# Patient Record
Sex: MALE | Race: WHITE | Employment: OTHER | URBAN - METROPOLITAN AREA
[De-identification: names, ages, dates, MRNs, and addresses within clinical notes are randomized per-mention and may not be internally consistent; named-entity substitution may affect disease eponyms.]

---

## 2017-02-15 ENCOUNTER — OFFICE VISIT (OUTPATIENT)
Dept: ENDOCRINOLOGY | Age: 72
End: 2017-02-15

## 2017-02-15 VITALS
BODY MASS INDEX: 28.49 KG/M2 | DIASTOLIC BLOOD PRESSURE: 85 MMHG | HEART RATE: 60 BPM | HEIGHT: 73 IN | WEIGHT: 215 LBS | SYSTOLIC BLOOD PRESSURE: 135 MMHG

## 2017-02-15 DIAGNOSIS — E03.8 OTHER SPECIFIED HYPOTHYROIDISM: Primary | ICD-10-CM

## 2017-02-15 PROBLEM — E03.9 HYPOTHYROIDISM: Status: ACTIVE | Noted: 2017-02-15

## 2017-02-15 NOTE — MR AVS SNAPSHOT
Visit Information Date & Time Provider Department Dept. Phone Encounter #  
 2/15/2017  9:10 AM Octavio Rodriguez MD [facility] [phone] [id number] Follow-up Instructions Return in about 3 months (around 5/15/2017). Your Appointments 3/1/2017  1:30 PM  
Office Visit with DES Pineda [address] [place] [address]) Appt Note: 3 Months Skin exam  
 [facility] [address]  
[address] [place] [address] [address] [place] [id number] 7/14/2017 10:30 AM  
Office Visit with DES Pineda [address] [place] [address] Road) Appt Note: 1 year skin exam  
 [facility] [address]  
[phone] Upcoming Health Maintenance Date Due Hepatitis C Screening 1945 DTaP/Tdap/Td series (1 - Tdap) 2/16/1966 FOBT Q 1 YEAR AGE 50-75 2/16/1995 ZOSTER VACCINE AGE 60> 2/16/2005 GLAUCOMA SCREENING Q2Y 2/16/2010 Pneumococcal 65+ High/Highest Risk (1 of 2 - PCV13) 2/16/2010 MEDICARE YEARLY EXAM 2/16/2010 INFLUENZA AGE 9 TO ADULT 8/1/2016 Allergies as of 2/15/2017  Review Complete On: 2/15/2017 By: Octavio Rodriguez MD  
  
 Severity Noted Reaction Type Reactions Codeine  03/21/2016    Other (comments) Bad dreams Current Immunizations  Never Reviewed No immunizations on file. Not reviewed this visit You Were Diagnosed With   
  
 Codes Comments Other specified hypothyroidism    -  Primary ICD-10-CM: E03.8 ICD-9-CM: 244.8 Vitals BP Pulse Height(growth percentile) Weight(growth percentile) BMI Smoking Status 135/85 60 6' 1\" (1.854 m) 215 lb (97.5 kg) 28.37 kg/m2 Never Smoker Vitals History BMI and BSA Data Body Mass Index Body Surface Area  
 28.37 kg/m 2 2.24 m 2 Preferred Pharmacy Pharmacy Name Phone [facility][id number]- [address] [facility], [address] [place] [address] Dr [phone] Your Updated Medication List  
  
   
This list is accurate as of: 2/15/17  9:47 AM.  Always use your most recent med list.  
  
  
  
  
 amiodarone 100 mg tablet Commonly known as:  Griselda Borer Take  by mouth daily. 1/4 tablet every 3 days  
  
 aspirin delayed-release 81 mg tablet Take 81 mg by mouth daily. BENICAR 40 mg tablet Generic drug:  olmesartan Take 40 mg by mouth once over twenty-four (24) hours. carvedilol 6.25 mg tablet Commonly known as:  Wileen Marshall Take 6.25 mg by mouth two (2) times a day. synthroid 50 mcg tablet Generic drug:  levothyroxine TAKE 1 TAB BY MOUTH DAILY (BEFORE BREAKFAST). BRAND NAME REQUIRED We Performed the Following T4, FREE [id number] CPT(R)] TSH 3RD GENERATION [45581 CPT(R)] Follow-up Instructions Return in about 3 months (around 5/15/2017). Introducing [facility] & HEALTH SERVICES! David Gonzalez introduces [employer] patient portal. Now you can access parts of your medical record, email your doctor's office, and request medication refills online. 1. In your internet browser, go to [email or web address] 2. Click on the First Time User? Click Here link in the Sign In box. You will see the New Member Sign Up page. 3. Enter your [employer] Access Code exactly as it appears below. You will not need to use this code after youve completed the sign-up process. If you do not sign up before the expiration date, you must request a new code. · [employer] Access Code: [email or web address] Expires: 5/16/2017  9:47 AM 
 
4. Enter the last four digits of your Social Security Number (xxxx) and Date of Birth (mm/dd/yyyy) as indicated and click Submit. You will be taken to the next sign-up page. 5. Create a [employer] ID. This will be your [employer] login ID and cannot be changed, so think of one that is secure and easy to remember. 6. Create a [employer] password. You can change your password at any time. 7. Enter your Password Reset Question and Answer. This can be used at a later time if you forget your password. 8. Enter your e-mail address. You will receive e-mail notification when new information is available in [address] 9. Click Sign Up. You can now view and download portions of your medical record. 10. Click the Download Summary menu link to download a portable copy of your medical information. If you have questions, please visit the Frequently Asked Questions section of the [employer] website. Remember, [employer] is NOT to be used for urgent needs. For medical emergencies, dial 911. Now available from your iPhone and Android! Please provide this summary of care documentation to your next provider. Your primary care clinician is listed as [address]. If you have any questions after today's visit, please call [phone].

## 2017-02-15 NOTE — PROGRESS NOTES
Chief Complaint   Patient presents with    Thyroid Problem     pcp and pharmacy verified   Records since last visit reviewed  History of Present Illness: Josiane Higgins is a 70 y.o. male here for follow up of hypothyroidism. Pt notes he was started on Amiodarone for Afib in 2013. He has continued on the Amiodarone, but his dose has been reduced and he is now taking 50mg every 3 days. In November 2015 his TSH was 11.49 he was started on LT4 50mcg, but he \"felt like I was having too much coffee\" so he cut it back to 25mg daily. By February 2016 his TSH was down to 8.72. In March his TSH was 7.79 with FT4 1.08 and TPO 20. Per the chart review he had been on generic LT4 and when he was on 50mcg he felt \"unwell\", but on 25mcg he felt ok. In March 2016 he agreed to try branded SYNTHROID 50mcg daily. In June 2016 his TSH was 4.22 and was continue on SYNTHROID 50mcg. He also had a thyroid US in March 2016 which was normal.    Pt presented to his PCP with concerns of a mole on his arm and a spot on his ear that he was concerned about possible cancer. He was referred to a Dermatologist, Dr. Mandi Perry who biopsied the ear and the arm lesion. The ear lesion was not cancerous but the mole on his left arm was positive for melanoma. It has no evidence of spread and his only treatment with resection only, no chemo or radiation. Pt had follow up with dermatology in December 2016 and there was no evidence of new cancerous lesions. Pt noted that his goal was to try and stop the SYNTHROID altogether. We agreed to try pt on SYNTHROID 25mcg daily and reassess in 3 months. His TSH last week was 1.820 with FT4 of 1.21    Pt has been taking 25mcg and 37mcg alternately. He has been off the amiodarone for 2 months. He denies issues of palpitations, tremors, CP, SOB, heat or cold intolerance, diarrhea or constipation.        Current Outpatient Prescriptions   Medication Sig    SYNTHROID 50 mcg tablet TAKE 1 TAB BY MOUTH DAILY (BEFORE BREAKFAST). BRAND NAME REQUIRED (Patient taking differently: TAKE 1 TAB BY MOUTH DAILY (BEFORE BREAKFAST). BRAND NAME REQUIRED. Alternates 25 mcg and 37 mcg each day.)    BENICAR 40 mg tablet Take 40 mg by mouth once over twenty-four (24) hours.  carvedilol (COREG) 6.25 mg tablet Take 6.25 mg by mouth two (2) times a day.  aspirin delayed-release 81 mg tablet Take 81 mg by mouth daily.  amiodarone (PACERONE) 100 mg tablet Take  by mouth daily. 1/4 tablet every 3 days     No current facility-administered medications for this visit. Allergies   Allergen Reactions    Codeine Other (comments)     Bad dreams     Review of Systems:  - Cardiovascular: no chest pain  - Neurological: no tremors  - Integumentary: skin is normal    Physical Examination:  Blood pressure 135/85, pulse 60, height 6' 1\" (1.854 m), weight 215 lb (97.5 kg). - General: pleasant, no distress, good eye contact   - Neck: no thyromegaly or thyroid bruits  - Cardiovascular: regular, normal rate, nl s1 and s2, no m/r/g   - Integumentary: skin is normal, no edema  - Neurological: reflexes 2+ at biceps, no tremors  - Psychiatric: normal mood and affect    Data Reviewed:   Component      Latest Ref Rng & Units 2/14/2017 2/14/2017          12:07 PM 12:07 PM   T4, Free      0.82 - 1.77 ng/dL  1.21   TSH      0.450 - 4.500 uIU/mL 1.820        Assessment/Plan:   1) Hypothyroidism > Pt is clinically euthyroid on the lower dose of SYNTHROID. Since he is now off the amiodarone, which seems to have been the cause of his hypothyroidism in the first place, will have pt stop the SYNTHROID altogether. We will repeat his TFTs in 3 months and see if he needs to continue the SYNTHROID or can stay off of it indefinitely. RTC 3 months. Follow-up Disposition:  Return in about 3 months (around 5/15/2017). Copy sent to:  Bryant Hamilton and Cathi Lara

## 2017-02-15 NOTE — PROGRESS NOTES
Blood pressure has been repeated in opposite arm. No symptoms, per patient. Had chinese food last pm. Stressful drive.

## 2017-03-14 ENCOUNTER — OFFICE VISIT (OUTPATIENT)
Dept: DERMATOLOGY | Facility: AMBULATORY SURGERY CENTER | Age: 72
End: 2017-03-14

## 2017-03-14 VITALS
SYSTOLIC BLOOD PRESSURE: 140 MMHG | BODY MASS INDEX: 28.49 KG/M2 | DIASTOLIC BLOOD PRESSURE: 82 MMHG | WEIGHT: 215 LBS | HEART RATE: 68 BPM | HEIGHT: 73 IN | OXYGEN SATURATION: 98 % | TEMPERATURE: 98.1 F | RESPIRATION RATE: 20 BRPM

## 2017-03-14 DIAGNOSIS — D22.9 MULTIPLE BENIGN NEVI: Primary | ICD-10-CM

## 2017-03-14 DIAGNOSIS — D18.01 CHERRY ANGIOMA: ICD-10-CM

## 2017-03-14 DIAGNOSIS — L91.8 CUTANEOUS SKIN TAGS: ICD-10-CM

## 2017-03-14 DIAGNOSIS — L90.5 SCAR CONDITION AND FIBROSIS OF SKIN: ICD-10-CM

## 2017-03-14 DIAGNOSIS — L81.4 LENTIGINES: ICD-10-CM

## 2017-03-14 DIAGNOSIS — L82.1 SEBORRHEIC KERATOSES: ICD-10-CM

## 2017-03-14 DIAGNOSIS — Z85.820 PERSONAL HISTORY OF MALIGNANT MELANOMA OF SKIN: ICD-10-CM

## 2017-03-14 NOTE — MR AVS SNAPSHOT
Visit Information Date & Time Provider Department Dept. Phone Encounter #  
 3/14/2017  9:00 AM DES Lema57 [phone] Your Appointments 3/14/2017  9:00 AM  
Office Visit with DES Lema 8057 [facility]) Appt Note: 3 Months Skin exam; 3 Months Skin exam  
 [facility] [address] [facility] [address] [place] [id number]  
  
    
 5/9/2017  8:50 AM  
Follow Up with Stacey Santiago MD  
[facility]) Appt Note: f/u           dm             3 month  
 [address] [facility] [address] [id number] [address] [place] [address] 7/14/2017 10:30 AM  
Office Visit with DES Lema 8057 [facility]) Appt Note: 1 year skin exam  
 [facility] [address] [place] [phone] Upcoming Health Maintenance Date Due Hepatitis C Screening 1945 DTaP/Tdap/Td series (1 - Tdap) 2/16/1966 FOBT Q 1 YEAR AGE 50-75 2/16/1995 ZOSTER VACCINE AGE 60> 2/16/2005 GLAUCOMA SCREENING Q2Y 2/16/2010 Pneumococcal 65+ High/Highest Risk (1 of 2 - PCV13) 2/16/2010 MEDICARE YEARLY EXAM 2/16/2010 INFLUENZA AGE 9 TO ADULT 8/1/2016 Allergies as of 3/14/2017  Review Complete On: 3/14/2017 By: Lobo Leong LPN Severity Noted Reaction Type Reactions Codeine  03/21/2016    Other (comments) Bad dreams Current Immunizations  Never Reviewed No immunizations on file. Not reviewed this visit Vitals BP Pulse Temp Resp Height(growth percentile) Weight(growth percentile)  140/82 (BP 1 Location: Left arm, BP Patient Position: Sitting) 68 98.1 °F (36.7 °C) (Oral) 20 6' 1\" (1.854 m) 215 lb (97.5 kg) SpO2 BMI Smoking Status 98% 28.37 kg/m2 Never Smoker Vitals History BMI and BSA Data Body Mass Index Body Surface Area  
 28.37 kg/m 2 2.24 m 2 Preferred Pharmacy Pharmacy Name Phone [id number]- [address] , [address] [place] [address]  [phone] Your Updated Medication List  
  
   
This list is accurate as of: 3/14/17  8:34 AM.  Always use your most recent med list.  
  
  
  
  
 amiodarone 100 mg tablet Commonly known as:  Wes Aguilar Take  by mouth daily. 1/4 tablet every 3 days  
  
 aspirin delayed-release 81 mg tablet Take 81 mg by mouth daily. BENICAR 40 mg tablet Generic drug:  olmesartan Take 40 mg by mouth once over twenty-four (24) hours. carvedilol 6.25 mg tablet Commonly known as:  Jestine Pellet Take 6.25 mg by mouth two (2) times a day. synthroid 50 mcg tablet Generic drug:  levothyroxine TAKE 1 TAB BY MOUTH DAILY (BEFORE BREAKFAST). BRAND NAME REQUIRED Introducing [facility] & HEALTH SERVICES! [employer] introduces [employer] patient portal. Now you can access parts of your medical record, email your doctor's office, and request medication refills online. 1. In your internet browser, go to [email or web address] 2. Click on the First Time User? Click Here link in the Sign In box. You will see the New Member Sign Up page. 3. Enter your [employer] Access Code exactly as it appears below. You will not need to use this code after youve completed the sign-up process. If you do not sign up before the expiration date, you must request a new code. · [employer] Access Code: [email or web address] Expires: 5/16/2017 10:47 AM 
 
4. Enter the last four digits of your Social Security Number (xxxx) and Date of Birth (mm/dd/yyyy) as indicated and click Submit. You will be taken to the next sign-up page. 5. Create a [employer] ID. This will be your [employer] login ID and cannot be changed, so think of one that is secure and easy to remember. 6. Create a [employer] password. You can change your password at any time. 7. Enter your Password Reset Question and Answer. This can be used at a later time if you forget your password. 8. Enter your e-mail address. You will receive e-mail notification when new information is available in [address] 9. Click Sign Up. You can now view and download portions of your medical record. 10. Click the Download Summary menu link to download a portable copy of your medical information. If you have questions, please visit the Frequently Asked Questions section of the [employer] website. Remember, [employer] is NOT to be used for urgent needs. For medical emergencies, dial 911. Now available from your iPhone and Android! Please provide this summary of care documentation to your next provider. Your primary care clinician is listed as [address]. If you have any questions after today's visit, please call [phone].

## 2017-03-14 NOTE — PROGRESS NOTES
Name: Augustina Singletary       Age: 67 y.o. Date: 3/14/2017    Chief Complaint:   Chief Complaint   Patient presents with    Skin Exam     3 Month. Pt seen in office today for routine skin assessment. No specified concerns today. Subjective:    HPI  Mr. Augustina Singletary is a 67 y.o. male who presents for a full skin exam.  The patient's last skin exam was on 16 and the patient does not have current complaints related to his skin. Mr. Augustina Singletary is feeling well and in his usual state of health today. The patient's pertinent skin history includes : Stage 1 am malignant melanoma of the left forearm (treated 2016), breslow depth of 0.55 mm    ROS: Constitutional: Negative. Dermatological : negative      Social History     Social History    Marital status:      Spouse name: N/A    Number of children: N/A    Years of education: N/A     Occupational History    Not on file.      Social History Main Topics    Smoking status: Never Smoker    Smokeless tobacco: Never Used    Alcohol use No    Drug use: No    Sexual activity: Not on file     Other Topics Concern    Not on file     Social History Narrative       Family History   Problem Relation Age of Onset    Other Mother      hypotension    Heart Failure Father     Cancer Father      Prostate    Other Father      PUD    Heart Disease Father      Heart Valve issue and replacement    Cancer Brother      Melanoma -  at 39yrs old   Hany Cords Cancer Sister      Hodgkin's in one, melanoma in other    Cancer Paternal Uncle      Prostate    Cancer Maternal Grandfather      Lung       Past Medical History:   Diagnosis Date    Family history of skin cancer     brother-melanoma    Hyperlipidemia     Hypertension     Sun-damaged skin     Thyroid disorder        Past Surgical History:   Procedure Laterality Date    HX TONSILLECTOMY      at 10years old       Current Outpatient Prescriptions   Medication Sig Dispense Refill    amiodarone (PACERONE) 100 mg tablet Take  by mouth daily. 1/4 tablet every 3 days      BENICAR 40 mg tablet Take 40 mg by mouth once over twenty-four (24) hours.  carvedilol (COREG) 6.25 mg tablet Take 6.25 mg by mouth two (2) times a day. 4    aspirin delayed-release 81 mg tablet Take 81 mg by mouth daily.  SYNTHROID 50 mcg tablet TAKE 1 TAB BY MOUTH DAILY (BEFORE BREAKFAST). BRAND NAME REQUIRED (Patient taking differently: TAKE 1 TAB BY MOUTH DAILY (BEFORE BREAKFAST). BRAND NAME REQUIRED. Alternates 25 mcg and 37 mcg each day.) 90 Tab 1       Allergies   Allergen Reactions    Codeine Other (comments)     Bad dreams         Objective:    Visit Vitals    /82 (BP 1 Location: Left arm, BP Patient Position: Sitting)    Pulse 68    Temp 98.1 °F (36.7 °C) (Oral)    Resp 20    Ht 6' 1\" (1.854 m)    Wt 97.5 kg (215 lb)    SpO2 98%    BMI 28.37 kg/m2       Jane Wilson is a 67 y.o. male who appears well and in no distress. He is awake, alert, and oriented. There is no preauricular, submandibular, or cervical lymphadenopathy. A skin examination was performed including his scalp, face (including eyelids), ears, neck, chest, back, abdomen, upper extremities (including digits/nails), lower extremities, breasts, buttocks; genital skin was not examined. There are lentigines on sun exposed areas including the posterior vertex scalp. There are scattered seborrheic keratoses on the scalp, trunk and extremities. There are scattered cherry angioams. There is a linear scar on the left posterior upper arm without nodularity or pigment and no associated left axillary lymphadenopathy. There are medium brown and pink nevi without concerning features and a few skin tags in each axilla. Assessment/Plan:  1. Normal nevi. The diagnosis of normal nevi was reviewed.   I discussed sun protection, sunscreen use, the warning signs of skin cancer, the need for self-skin examinations, and the need for regular practitioner exams every 3 months. The patient should follow up sooner as needed if new, changing, or symptomatic skin lesions arise. 2. Skin tag, Seborrheic keratoses. The diagnosis was reviewed and the patient was reassured that no treatment is needed for these benign lesions. 3.Cherry angiomas. The diagnosis was reviewed and the patient was reassured that no treatment is needed for these benign lesions. 4. Personal history of skin cancer, stage 1 a malignant melanoma. I discussed sun protection, sunscreen use, the warning signs of skin cancer, the need for self-skin examinations, and the need for regular practitioner exams every 3 months. The patient should follow up sooner as needed if new, changing, or symptomatic skin lesions arise. 5. Solar lentigos. The diagnosis and relationship to sun exposure was reviewed. Sun protection advised.

## 2017-05-09 ENCOUNTER — OFFICE VISIT (OUTPATIENT)
Dept: ENDOCRINOLOGY | Age: 72
End: 2017-05-09

## 2017-05-09 VITALS
SYSTOLIC BLOOD PRESSURE: 160 MMHG | WEIGHT: 220.2 LBS | DIASTOLIC BLOOD PRESSURE: 98 MMHG | BODY MASS INDEX: 29.18 KG/M2 | HEART RATE: 67 BPM | HEIGHT: 73 IN

## 2017-05-09 DIAGNOSIS — E03.2 HYPOTHYROIDISM DUE TO MEDICATION: Primary | ICD-10-CM

## 2017-05-09 PROBLEM — E03.9 HYPOTHYROIDISM: Status: RESOLVED | Noted: 2017-02-15 | Resolved: 2017-05-09

## 2017-05-09 LAB
T4 FREE SERPL-MCNC: 1.1 NG/DL (ref 0.82–1.77)
TSH SERPL DL<=0.005 MIU/L-ACNC: 2.49 UIU/ML (ref 0.45–4.5)

## 2017-05-09 NOTE — PROGRESS NOTES
Chief Complaint   Patient presents with    Thyroid Problem     pcp and pharmcy verified   Records since last visit reviewed  History of Present Illness: Monica Dhaliwal is a 67 y.o. male here for follow up of hypothyroidism. Pt notes he was started on Amiodarone for Afib in 2013. He has continued on the Amiodarone, but his dose has been reduced and he is now taking 50mg every 3 days. In November 2015 his TSH was 11.49 he was started on LT4 50mcg, but he \"felt like I was having too much coffee\" so he cut it back to 25mg daily. By February 2016 his TSH was down to 8.72. In March his TSH was 7.79 with FT4 1.08 and TPO 20. Per the chart review he had been on generic LT4 and when he was on 50mcg he felt \"unwell\", but on 25mcg he felt ok. In March 2016 he agreed to try branded SYNTHROID 50mcg daily. In June 2016 his TSH was 4.22 and was continue on SYNTHROID 50mcg. He also had a thyroid US in March 2016 which was normal.    In November 2016 he noted that his goal was to stop the Synthroid and since he had been weaning down on the dose of Amiodarone and he was clinically euthyroid. We decreased his dose of Synthroid to 25mcg daily. He stopped the Amiodarone in December 2016 and in February 2017 his TSH was 1.82 with FT4 of 1.21 on Synthroid 25mcg daily. We stopped the Synthroid and decided to retest after he had been off the Synthroid for 3 months. Last week his TSh was 2.49 with FT4 of 1.10. He has been off the Synthroid since February 2017 and off the Amiodarone since December 2016. He denies issues of palpitations, tremors, CP, SOB, heat or cold intolerance, diarrhea or constipation. Current Outpatient Prescriptions   Medication Sig    BENICAR 40 mg tablet Take 40 mg by mouth once over twenty-four (24) hours.  carvedilol (COREG) 6.25 mg tablet Take 6.25 mg by mouth two (2) times a day.  aspirin delayed-release 81 mg tablet Take 81 mg by mouth daily.      No current facility-administered medications for this visit. Allergies   Allergen Reactions    Codeine Other (comments)     Bad dreams     Review of Systems:  - Cardiovascular: no chest pain  - Neurological: no tremors  - Integumentary: skin is normal    Physical Examination:  Blood pressure (!) 160/98, pulse 67, height 6' 1\" (1.854 m), weight 220 lb 3.2 oz (99.9 kg). - General: pleasant, no distress, good eye contact   - Neck: no thyromegaly or thyroid bruits  - Cardiovascular: regular, normal rate, nl s1 and s2, no m/r/g   - Integumentary: skin is normal, no edema  - Neurological: reflexes 2+ at biceps, no tremors  - Psychiatric: normal mood and affect    Data Reviewed:   Component      Latest Ref Rng & Units 5/8/2017 5/8/2017          12:00 AM 12:00 AM   TSH      0.450 - 4.500 uIU/mL  2.490   T4, Free      0.82 - 1.77 ng/dL 1.10        Assessment/Plan:   1) Hypothyroidism > Pt is clinically euthyroid off the Synthroid. I suspect the Amiodarone caused some thyroid dysfunction, but since that was stopped, his thyroid has normalized. Pt to stay off the Synthroid. He does not need my services any longer, but pt to call for follow up if he has any new symptoms, issues or problems. Copy sent to:  Bryant Zarate and Tobi Sullivan

## 2017-05-18 ENCOUNTER — TELEPHONE (OUTPATIENT)
Dept: FAMILY MEDICINE CLINIC | Age: 72
End: 2017-05-18

## 2017-07-11 ENCOUNTER — OFFICE VISIT (OUTPATIENT)
Dept: FAMILY MEDICINE CLINIC | Age: 72
End: 2017-07-11

## 2017-07-11 ENCOUNTER — HOSPITAL ENCOUNTER (OUTPATIENT)
Dept: LAB | Age: 72
Discharge: HOME | End: 2017-07-11
Payer: MEDICARE

## 2017-07-11 VITALS
HEIGHT: 74 IN | WEIGHT: 218.6 LBS | HEART RATE: 63 BPM | DIASTOLIC BLOOD PRESSURE: 98 MMHG | TEMPERATURE: 98.1 F | SYSTOLIC BLOOD PRESSURE: 152 MMHG | BODY MASS INDEX: 28.06 KG/M2 | OXYGEN SATURATION: 96 % | RESPIRATION RATE: 20 BRPM

## 2017-07-11 DIAGNOSIS — Z00.00 MEDICARE ANNUAL WELLNESS VISIT, SUBSEQUENT: Primary | ICD-10-CM

## 2017-07-11 DIAGNOSIS — R35.1 NOCTURIA: ICD-10-CM

## 2017-07-11 DIAGNOSIS — R79.89 ELEVATED TSH: ICD-10-CM

## 2017-07-11 DIAGNOSIS — Z12.5 SCREENING FOR PROSTATE CANCER: ICD-10-CM

## 2017-07-11 DIAGNOSIS — Z12.11 SCREENING FOR COLON CANCER: ICD-10-CM

## 2017-07-11 DIAGNOSIS — Z13.220 SCREENING FOR HYPERLIPIDEMIA: ICD-10-CM

## 2017-07-11 DIAGNOSIS — Z11.59 ENCOUNTER FOR HEPATITIS C SCREENING TEST FOR LOW RISK PATIENT: ICD-10-CM

## 2017-07-11 DIAGNOSIS — Z13.5 SCREENING FOR GLAUCOMA: ICD-10-CM

## 2017-07-11 PROCEDURE — 80061 LIPID PANEL: CPT

## 2017-07-11 PROCEDURE — 80053 COMPREHEN METABOLIC PANEL: CPT

## 2017-07-11 PROCEDURE — 85025 COMPLETE CBC W/AUTO DIFF WBC: CPT

## 2017-07-11 PROCEDURE — 86803 HEPATITIS C AB TEST: CPT

## 2017-07-11 PROCEDURE — 84443 ASSAY THYROID STIM HORMONE: CPT

## 2017-07-11 PROCEDURE — 86141 C-REACTIVE PROTEIN HS: CPT

## 2017-07-11 PROCEDURE — 36415 COLL VENOUS BLD VENIPUNCTURE: CPT

## 2017-07-11 PROCEDURE — 84153 ASSAY OF PSA TOTAL: CPT

## 2017-07-11 NOTE — PATIENT INSTRUCTIONS

## 2017-07-11 NOTE — PROGRESS NOTES
Identified pt with two pt identifiers(name and ). Chief Complaint   Patient presents with    Annual Wellness Visit        Health Maintenance Due   Topic    Hepatitis C Screening     FOBT Q 1 YEAR AGE 50-75     ZOSTER VACCINE AGE 60>     GLAUCOMA SCREENING Q2Y     Pneumococcal 65+ High/Highest Risk (1 of 2 - PCV13)    MEDICARE YEARLY EXAM    Saw Dr Nancy Garvin for eye exam - she moved to [place] - has appt coming up at [facility]. Had shingles     Wt Readings from Last 3 Encounters:   17 218 lb 9.6 oz (99.2 kg)   17 220 lb 3.2 oz (99.9 kg)   17 215 lb (97.5 kg)     Temp Readings from Last 3 Encounters:   17 98.1 °F (36.7 °C) (Oral)   17 98.1 °F (36.7 °C) (Oral)   16 98.2 °F (36.8 °C) (Oral)     BP Readings from Last 3 Encounters:   17 (!) 152/98   17 (!) 160/98   17 140/82     Pulse Readings from Last 3 Encounters:   17 63   17 67   17 68         Learning Assessment:  :     Learning Assessment 2016 2016 3/21/2016   PRIMARY LEARNER Patient Patient Patient   HIGHEST LEVEL OF EDUCATION - PRIMARY LEARNER  - - 4 YEARS OF COLLEGE   BARRIERS PRIMARY LEARNER - NONE NONE   CO-LEARNER CAREGIVER - No No   PRIMARY LANGUAGE ENGLISH ENGLISH ENGLISH   LEARNER PREFERENCE PRIMARY DEMONSTRATION DEMONSTRATION DEMONSTRATION     - - READING     - - LISTENING   LEARNING SPECIAL TOPICS no no -   ANSWERED BY patient patient patient   RELATIONSHIP SELF SELF SELF   ASSESSMENT COMMENT none none -       Depression Screening:  :     PHQ over the last two weeks 2017   Little interest or pleasure in doing things Not at all   Feeling down, depressed or hopeless Not at all   Total Score PHQ 2 0       Fall Risk Assessment:  :     Fall Risk Assessment, last 12 mths 2017   Able to walk? Yes   Fall in past 12 months? Yes   Fall with injury?  No   Number of falls in past 12 months 1   Fall Risk Score 1       Abuse Screening:  :     Abuse Screening Questionnaire 7/11/2017   Do you ever feel afraid of your partner? N   Are you in a relationship with someone who physically or mentally threatens you? N   Is it safe for you to go home? Y       Coordination of Care Questionnaire:  :     1) Have you been to an emergency room, urgent care clinic since your last visit? no   Hospitalized since your last visit? no             2) Have you seen or consulted any other health care providers outside of [address] [place] [address] since your last visit? yes  Dr Harshad Moon 5/9/17 - he does not have to go back for a year. Dr Verneta Epley [facility]at[facility] and Dr Rayne Ravi (Include any pap smears or colon screenings in this section.)    3) Do you have an Advance Directive on file? yes  Are you interested in receiving information about Advance Directives? no    Reviewed record in preparation for visit and have obtained necessary documentation. Medication reconciliation up to date and corrected with patient at this time.

## 2017-07-11 NOTE — MR AVS SNAPSHOT
Visit Information Date & Time Provider Department Dept. Phone Encounter #  
 7/11/2017  8:30 AM Linda ArceoJacob [phone] [id number] Your Appointments 7/21/2017  8:00 AM  
Office Visit with DES Smith [address] [facility]) Appt Note: 1 year skin exam; 1 year skin exam  
 [facility] [address] [facility] [address]  
[address] [place] [address] [address] [facility] [address] Upcoming Health Maintenance Date Due FOBT Q 1 YEAR AGE 50-75 2/16/1995 GLAUCOMA SCREENING Q2Y 2/16/2010 Pneumococcal 65+ High/Highest Risk (1 of 2 - PCV13) 2/16/2010 INFLUENZA AGE 9 TO ADULT 8/1/2017 MEDICARE YEARLY EXAM 7/12/2018 DTaP/Tdap/Td series (2 - Td) 7/11/2027 Allergies as of 7/11/2017  Review Complete On: 7/11/2017 By: Linda Arceo MD  
  
 Severity Noted Reaction Type Reactions Codeine  03/21/2016    Other (comments) Bad dreams Current Immunizations  Never Reviewed No immunizations on file. Not reviewed this visit You Were Diagnosed With   
  
 Codes Comments Medicare annual wellness visit, subsequent    -  Primary ICD-10-CM: Z00.00 ICD-9-CM: V70.0 Screening for colon cancer     ICD-10-CM: Z12.11 ICD-9-CM: V76.51 Screening for glaucoma     ICD-10-CM: Z13.5 ICD-9-CM: V80.1 Screening for prostate cancer     ICD-10-CM: Z12.5 ICD-9-CM: V76.44 Screening for hyperlipidemia     ICD-10-CM: Z13.220 ICD-9-CM: V77.91 Elevated TSH     ICD-10-CM: R94.6 ICD-9-CM: 794.5 Nocturia     ICD-10-CM: R35.1 ICD-9-CM: 788.43 Encounter for hepatitis C screening test for low risk patient     ICD-10-CM: Z11.59 
ICD-9-CM: V73.89 Vitals BP Pulse Temp Resp Height(growth percentile) Weight(growth percentile)  (!) 152/98 (BP 1 Location: Right arm, BP Patient Position: Sitting) 63 98.1 °F (36.7 °C) (Oral) 20 6' 1.62\" (1.87 m) 218 lb 9.6 oz (99.2 kg) SpO2 BMI Smoking Status 96% 28.36 kg/m2 Never Smoker Vitals History BMI and BSA Data Body Mass Index Body Surface Area  
 28.36 kg/m 2 2.27 m 2 Preferred Pharmacy Pharmacy Name Phone [id number] [phone] [facility], [address] [place] [address]  [phone] Your Updated Medication List  
  
   
This list is accurate as of: 17  9:21 AM.  Always use your most recent med list.  
  
  
  
  
 aspirin delayed-release 81 mg tablet Take 81 mg by mouth daily. BENICAR 40 mg tablet Generic drug:  olmesartan Take 40 mg by mouth once over twenty-four (24) hours. carvedilol 6.25 mg tablet Commonly known as:  Malena Ready Take 6.25 mg by mouth two (2) times a day. diph,pertuss(acel),tetanus vac(PF) 2 Lf-(2.5-5-3-5 mcg)-5Lf/0.5 mL Syrg vaccine Commonly known as:  ADACEL  
0.5 mL by IntraMUSCular route once for 1 dose. pneumococcal 23-valent 25 mcg/0.5 mL injection Commonly known as:  PNEUMOVAX 23  
0.5 mL by IntraMUSCular route PRIOR TO DISCHARGE for 1 dose. Prescriptions Printed Refills  
 pneumococcal 23-valent (PNEUMOVAX 23) 25 mcg/0.5 mL injection 0 Si.5 mL by IntraMUSCular route PRIOR TO DISCHARGE for 1 dose. Class: Print Route: IntraMUSCular  
 diph,pertuss,acel,,tetanus vac,PF, (ADACEL) 2 Lf-(2.5-5-3-5 mcg)-5Lf/0.5 mL syrg vaccine 0 Si.5 mL by IntraMUSCular route once for 1 dose. Class: Print Route: IntraMUSCular We Performed the Following CBC WITH AUTOMATED DIFF [88726 CPT(R)] CRP, HIGH SENSITIVITY [16261 CPT(R)] HEPATITIS C AB [27373 CPT(R)] LIPID PANEL [38385 CPT(R)] METABOLIC PANEL, COMPREHENSIVE [26382 CPT(R)] PROSTATE SPECIFIC AG (PSA) [id number] CPT(R)] REFERRAL TO GASTROENTEROLOGY [OPF79 Custom] Comments:  
 Please evaluate patient for screening for colon cancer. REFERRAL TO OPHTHALMOLOGY [REF57 Custom] Comments:  
 Please evaluate patient for glaucoma screening and eye exam.  
 THYROID CASCADE PROFILE [[id number] Custom] Referral Information Referral ID Referred By Referred To  
  
 [id number] Neo FELIZ 336   
   [address] [facility] [address]  [place], [address] Visits Status Start Date End Date 1 New Request 7/11/17 7/11/18 If your referral has a status of pending review or denied, additional information will be sent to support the outcome of this decision. Referral ID Referred By Referred To  
 [id number] Ralph Aas [facility] [address] [place], [address] Visits Status Start Date End Date 1 New Request 7/11/17 7/11/18 If your referral has a status of pending review or denied, additional information will be sent to support the outcome of this decision. Patient Instructions Well Visit, Over 72: Care Instructions Your Care Instructions Physical exams can help you stay healthy. Your doctor has checked your overall health and may have suggested ways to take good care of yourself. He or she also may have recommended tests. At home, you can help prevent illness with healthy eating, regular exercise, and other steps. Follow-up care is a key part of your treatment and safety. Be sure to make and go to all appointments, and call your doctor if you are having problems. It's also a good idea to know your test results and keep a list of the medicines you take. How can you care for yourself at home? · Reach and stay at a healthy weight. This will lower your risk for many problems, such as obesity, diabetes, heart disease, and high blood pressure. · Get at least 30 minutes of exercise on most days of the week. Walking is a good choice. You also may want to do other activities, such as running, swimming, cycling, or playing tennis or team sports. · Do not smoke. Smoking can make health problems worse. If you need help quitting, talk to your doctor about stop-smoking programs and medicines. These can increase your chances of quitting for good. · Protect your skin from too much sun. When you're outdoors from 10 a.m. to 4 p.m., stay in the shade or cover up with clothing and a hat with a wide brim. Wear sunglasses that block UV rays. Even when it's cloudy, put broad-spectrum sunscreen (SPF 30 or higher) on any exposed skin. · See a dentist one or two times a year for checkups and to have your teeth cleaned. · Wear a seat belt in the car. · Limit alcohol to 2 drinks a day for men and 1 drink a day for women. Too much alcohol can cause health problems. Follow your doctor's advice about when to have certain tests. These tests can spot problems early. For men and women · Cholesterol. Your doctor will tell you how often to have this done based on your overall health and other things that can increase your risk for heart attack and stroke. · Blood pressure. Have your blood pressure checked during a routine doctor visit. Your doctor will tell you how often to check your blood pressure based on your age, your blood pressure results, and other factors. · Diabetes. Ask your doctor whether you should have tests for diabetes. · Vision. Experts recommend that you have yearly exams for glaucoma and other age-related eye problems. · Hearing. Tell your doctor if you notice any change in your hearing. You can have tests to find out how well you hear. · Colon cancer tests. Keep having colon cancer tests as your doctor recommends. You can have one of several types of tests. · Heart attack and stroke risk. At least every 4 to 6 years, you should have your risk for heart attack and stroke assessed.  Your doctor uses factors such as your age, blood pressure, cholesterol, and whether you smoke or have diabetes to show what your risk for a heart attack or stroke is over the next 10 years. · Osteoporosis. Talk to your doctor about whether you should have a bone density test to find out whether you have thinning bones. Also ask your doctor about whether you should take calcium and vitamin D supplements. For women · Pap test and pelvic exam. You may no longer need a Pap test. Talk with your doctor about whether to stop or continue to have Pap tests. · Breast exam and mammogram. Ask how often you should have a mammogram, which is an X-ray of your breasts. A mammogram can spot breast cancer before it can be felt and when it is easiest to treat. · Thyroid disease. Talk to your doctor about whether to have your thyroid checked as part of a regular physical exam. Women have an increased chance of a thyroid problem. For men · Prostate exam. Talk to your doctor about whether you should have a blood test (called a PSA test) for prostate cancer. Experts disagree on whether men should have this test. Some experts recommend that you discuss the benefits and risks of the test with your doctor. · Abdominal aortic aneurysm. Ask your doctor whether you should have a test to check for an aneurysm. You may need a test if you ever smoked or if your parent, brother, sister, or child has had an aneurysm. When should you call for help? Watch closely for changes in your health, and be sure to contact your doctor if you have any problems or symptoms that concern you. Where can you learn more? Go to http://allie-valdemar.info/. Enter L622 in the search box to learn more about \"Well Visit, Over 65: Care Instructions. \" Current as of: July 19, 2016 Content Version: 11.3 © 9601-0172 [employer]. Care instructions adapted under license by [employer] (which disclaims liability or warranty for this information).  If you have questions about a medical condition or this instruction, always ask your healthcare professional. [employer] disclaims any warranty or liability for your use of this information. Introducing [facility]! Oralia Donohue introduces Instabeat patient portal. Now you can access parts of your medical record, email your doctor's office, and request medication refills online. 1. In your internet browser, go to [email or web address] 2. Click on the First Time User? Click Here link in the Sign In box. You will see the New Member Sign Up page. 3. Enter your Instabeat Access Code exactly as it appears below. You will not need to use this code after youve completed the sign-up process. If you do not sign up before the expiration date, you must request a new code. · Instabeat Access Code: [email or web address] Expires: 10/9/2017  9:21 AM 
 
4. Enter the last four digits of your Social Security Number (xxxx) and Date of Birth (mm/dd/yyyy) as indicated and click Submit. You will be taken to the next sign-up page. 5. Create a Instabeat ID. This will be your Instabeat login ID and cannot be changed, so think of one that is secure and easy to remember. 6. Create a Instabeat password. You can change your password at any time. 7. Enter your Password Reset Question and Answer. This can be used at a later time if you forget your password. 8. Enter your e-mail address. You will receive e-mail notification when new information is available in [address] 9. Click Sign Up. You can now view and download portions of your medical record. 10. Click the Download Summary menu link to download a portable copy of your medical information. If you have questions, please visit the Frequently Asked Questions section of the Instabeat website. Remember, Instabeat is NOT to be used for urgent needs. For medical emergencies, dial 911. Now available from your iPhone and Android! Please provide this summary of care documentation to your next provider. Your primary care clinician is listed as [address]. If you have any questions after today's visit, please call [phone].

## 2017-07-13 LAB
ALBUMIN SERPL-MCNC: 4.6 G/DL (ref 3.5–4.8)
ALBUMIN/GLOB SERPL: 1.7 {RATIO} (ref 1.2–2.2)
ALP SERPL-CCNC: 71 IU/L (ref 39–117)
ALT SERPL-CCNC: 17 IU/L (ref 0–44)
AST SERPL-CCNC: 18 IU/L (ref 0–40)
BASOPHILS # BLD AUTO: 0 X10E3/UL (ref 0–0.2)
BASOPHILS NFR BLD AUTO: 1 %
BILIRUB SERPL-MCNC: 0.7 MG/DL (ref 0–1.2)
BUN SERPL-MCNC: 13 MG/DL (ref 8–27)
BUN/CREAT SERPL: 14 (ref 10–24)
CALCIUM SERPL-MCNC: 9.6 MG/DL (ref 8.6–10.2)
CHLORIDE SERPL-SCNC: 99 MMOL/L (ref 96–106)
CHOLEST SERPL-MCNC: 156 MG/DL (ref 100–199)
CO2 SERPL-SCNC: 30 MMOL/L (ref 18–29)
CREAT SERPL-MCNC: 0.9 MG/DL (ref 0.76–1.27)
CRP SERPL HS-MCNC: 2.01 MG/L (ref 0–3)
EOSINOPHIL # BLD AUTO: 0.1 X10E3/UL (ref 0–0.4)
EOSINOPHIL NFR BLD AUTO: 1 %
ERYTHROCYTE [DISTWIDTH] IN BLOOD BY AUTOMATED COUNT: 14.3 % (ref 12.3–15.4)
GLOBULIN SER CALC-MCNC: 2.7 G/DL (ref 1.5–4.5)
GLUCOSE SERPL-MCNC: 83 MG/DL (ref 65–99)
HCT VFR BLD AUTO: 44.7 % (ref 37.5–51)
HCV AB S/CO SERPL IA: <0.1 S/CO RATIO (ref 0–0.9)
HDLC SERPL-MCNC: 47 MG/DL
HGB BLD-MCNC: 14.8 G/DL (ref 12.6–17.7)
IMM GRANULOCYTES # BLD: 0 X10E3/UL (ref 0–0.1)
IMM GRANULOCYTES NFR BLD: 0 %
INTERPRETATION, 910389: NORMAL
LDLC SERPL CALC-MCNC: 94 MG/DL (ref 0–99)
LYMPHOCYTES # BLD AUTO: 1.8 X10E3/UL (ref 0.7–3.1)
LYMPHOCYTES NFR BLD AUTO: 29 %
MCH RBC QN AUTO: 30.8 PG (ref 26.6–33)
MCHC RBC AUTO-ENTMCNC: 33.1 G/DL (ref 31.5–35.7)
MCV RBC AUTO: 93 FL (ref 79–97)
MONOCYTES # BLD AUTO: 0.7 X10E3/UL (ref 0.1–0.9)
MONOCYTES NFR BLD AUTO: 11 %
NEUTROPHILS # BLD AUTO: 3.6 X10E3/UL (ref 1.4–7)
NEUTROPHILS NFR BLD AUTO: 58 %
PLATELET # BLD AUTO: 253 X10E3/UL (ref 150–379)
POTASSIUM SERPL-SCNC: 4.5 MMOL/L (ref 3.5–5.2)
PROT SERPL-MCNC: 7.3 G/DL (ref 6–8.5)
PSA SERPL-MCNC: 2.4 NG/ML (ref 0–4)
RBC # BLD AUTO: 4.81 X10E6/UL (ref 4.14–5.8)
SODIUM SERPL-SCNC: 144 MMOL/L (ref 134–144)
TRIGL SERPL-MCNC: 77 MG/DL (ref 0–149)
TSH SERPL DL<=0.005 MIU/L-ACNC: 2.29 UIU/ML (ref 0.45–4.5)
VLDLC SERPL CALC-MCNC: 15 MG/DL (ref 5–40)
WBC # BLD AUTO: 6.1 X10E3/UL (ref 3.4–10.8)

## 2017-07-21 ENCOUNTER — OFFICE VISIT (OUTPATIENT)
Dept: DERMATOLOGY | Facility: AMBULATORY SURGERY CENTER | Age: 72
End: 2017-07-21

## 2017-07-21 VITALS
HEIGHT: 74 IN | HEART RATE: 73 BPM | RESPIRATION RATE: 18 BRPM | WEIGHT: 218 LBS | TEMPERATURE: 98.1 F | SYSTOLIC BLOOD PRESSURE: 155 MMHG | DIASTOLIC BLOOD PRESSURE: 85 MMHG | OXYGEN SATURATION: 97 % | BODY MASS INDEX: 27.98 KG/M2

## 2017-07-21 DIAGNOSIS — L82.1 SEBORRHEIC KERATOSES: ICD-10-CM

## 2017-07-21 DIAGNOSIS — L90.5 SCAR CONDITION AND FIBROSIS OF SKIN: ICD-10-CM

## 2017-07-21 DIAGNOSIS — L81.4 LENTIGINES: ICD-10-CM

## 2017-07-21 DIAGNOSIS — D18.01 CHERRY ANGIOMA: ICD-10-CM

## 2017-07-21 DIAGNOSIS — D22.9 MULTIPLE BENIGN NEVI: Primary | ICD-10-CM

## 2017-07-21 DIAGNOSIS — Z85.820 PERSONAL HISTORY OF MALIGNANT MELANOMA OF SKIN: ICD-10-CM

## 2017-07-31 NOTE — PROGRESS NOTES
Madison Martinez is a 67 y.o. male and presents for annual Medicare Wellness Visit. He has been healthy and doing well. He was evaluated by endocrinology and found to have a normal TSH. Now off of any levothyroxine. No admissions to hospital or ER. Did have a melanoma which was found on his upper left arm. Seeing dermatology routinely for follow up after excision and removal.     Problem List: Reviewed with patient and discussed risk factors. Patient Active Problem List   Diagnosis Code    Elevated TSH R94.6    Melanoma of left upper arm ([facility] [address] [address] C43.62       Current medical providers:  Patient Care Team:  Baldo Biggs MD as PCP - General (IM)    PSH: Reviewed with patient  Past Surgical History:   Procedure Laterality Date    HX TONSILLECTOMY      at 10years old        SH: Reviewed with patient  Social History   Substance Use Topics    Smoking status: Never Smoker    Smokeless tobacco: Never Used    Alcohol use No       FH: Reviewed with patient  Family History   Problem Relation Age of Onset    Other Mother      hypotension    Heart Failure Father     Cancer Father      Prostate    Other Father      PUD    Heart Disease Father      Heart Valve issue and replacement    Cancer Brother      Melanoma -  at 39yrs old   [address] [place] [address] Cancer Sister      Hodgkin's in one, melanoma in other    Cancer Paternal Uncle      Prostate    Cancer Maternal Grandfather      Lung       Medications/Allergies: Reviewed with patient  Current Outpatient Prescriptions on File Prior to Visit   Medication Sig Dispense Refill    BENICAR 40 mg tablet Take 40 mg by mouth once over twenty-four (24) hours.  carvedilol (COREG) 6.25 mg tablet Take 6.25 mg by mouth two (2) times a day. 4    aspirin delayed-release 81 mg tablet Take 81 mg by mouth daily. No current facility-administered medications on file prior to visit.        Allergies   Allergen Reactions    Codeine Other (comments)     Bad dreams       Objective:  Visit Vitals    BP (!) 152/98 (BP 1 Location: Right arm, BP Patient Position: Sitting)  Comment: dennise    Pulse 63    Temp 98.1 °F (36.7 °C) (Oral)    Resp 20    Ht 6' 1.62\" (1.87 m)    Wt 218 lb 9.6 oz (99.2 kg)    SpO2 96%    BMI 28.36 kg/m2    Body mass index is 28.36 kg/(m^2). Assessment of cognitive impairment: Alert and oriented x 3  General Appearance:  Well developed, well nourished,alert and oriented x 3, and individual in no acute distress. Ears/Nose/Mouth/Throat:   Hearing grossly normal.         Neck: Supple. Chest:   Lungs clear to auscultation bilaterally. Cardiovascular:  Regular rate and rhythm, S1, S2 normal, no murmur. Abdomen:   Soft, non-tender, bowel sounds are active. Extremities: No edema bilaterally. Skin: Warm and dry. Depression Screen:   PHQ over the last two weeks 7/11/2017   Little interest or pleasure in doing things Not at all   Feeling down, depressed or hopeless Not at all   Total Score PHQ 2 0       Fall Risk Assessment:    Fall Risk Assessment, last 12 mths 7/11/2017   Able to walk? Yes   Fall in past 12 months? Yes   Fall with injury? No   Number of falls in past 12 months 1   Fall Risk Score 1       Functional Ability:   Does the patient exhibit a steady gait? yes   How long did it take the patient to get up and walk from a sitting position? Few seconds   Is the patient self reliant?  (ie can do own laundry, meals, household chores)  yes     Does the patient handle his/her own medications? yes     Does the patient handle his/her own money? yes     Is the patients home safe (ie good lighting, handrails on stairs and bath, etc.)? yes     Did you notice or did patient express any hearing difficulties? no     Did you notice or did patient express any vision difficulties?   no     Were distance and reading eye charts used?   no       Advance Care Planning:   Patient was offered the opportunity to discuss advance care planning:  yes     Does patient have an Advance Directive:  yes   If no, did you provide information on Caring Connections? N/A       Plan:      Orders Placed This Encounter    LIPID PANEL    CRP, HIGH SENSITIVITY    METABOLIC PANEL, COMPREHENSIVE    CBC WITH AUTOMATED DIFF    PROSTATE SPECIFIC AG    THYROID CASCADE PROFILE    HEPATITIS C AB    CVD REPORT    REFERRAL TO GASTROENTEROLOGY    REFERRAL TO OPHTHALMOLOGY    pneumococcal 23-valent (PNEUMOVAX 23) 25 mcg/0.5 mL injection    diph,pertuss,acel,,tetanus vac,PF, (ADACEL) 2 Lf-(2.5-5-3-5 mcg)-5Lf/0.5 mL syrg vaccine       Health Maintenance   Topic Date Due    FOBT Q 1 YEAR AGE 50-75  Addressed    GLAUCOMA SCREENING Q2Y  Addressed    Pneumococcal 65+ High/Highest Risk (1 of 2 - PCV13) Addressed    INFLUENZA AGE 9 TO ADULT  08/01/2017    MEDICARE YEARLY EXAM  07/12/2018    DTaP/Tdap/Td series (2 - Td) 07/11/2027    Hepatitis C Screening  Completed    ZOSTER VACCINE AGE 60>  Addressed       ASSESSMENT and PLAN:    ICD-10-CM ICD-9-CM    1. Medicare annual wellness visit, subsequent Z00.00 V70.0 Anticipatory guidance discussed. Immunizations reviewed  HM updated. 2. Screening for colon cancer Z12.11 V76.51 REFERRAL TO GASTROENTEROLOGY   3. Screening for glaucoma Z13.5 V80.1 REFERRAL TO OPHTHALMOLOGY   4. Screening for prostate cancer Z12.5 V76.44 PROSTATE SPECIFIC AG (PSA)   5. Screening for hyperlipidemia Z13.220 V77.91 LIPID PANEL      CRP, HIGH SENSITIVITY      METABOLIC PANEL, COMPREHENSIVE      CBC WITH AUTOMATED DIFF   6. Elevated TSH R94.6 794.5 THYROID CASCADE PROFILE   7. Nocturia  R35.1 788.43 PROSTATE SPECIFIC AG (PSA)   8. Encounter for hepatitis C screening test for low risk patient Z11.59 V73.89 HEPATITIS C AB     I have discussed the diagnosis with the patient and the intended treatment plan as seen in the above orders. The patient has received an after-visit summary and questions were answered concerning future plans.  Asked to return should symptoms worsen or not improve with treatment. Any pending labs and studies will be relayed to patient when they become available. Pt verbalizes understanding of plan of care and denies further questions or concerns at this time. Follow-up Disposition:  Return in about 1 year (around 7/11/2018) for [address]

## 2018-01-03 ENCOUNTER — OFFICE VISIT (OUTPATIENT)
Dept: DERMATOLOGY | Facility: AMBULATORY SURGERY CENTER | Age: 73
End: 2018-01-03

## 2018-01-03 VITALS
BODY MASS INDEX: 27.34 KG/M2 | HEART RATE: 66 BPM | DIASTOLIC BLOOD PRESSURE: 86 MMHG | SYSTOLIC BLOOD PRESSURE: 152 MMHG | WEIGHT: 213 LBS | TEMPERATURE: 97.9 F | OXYGEN SATURATION: 95 % | HEIGHT: 74 IN | RESPIRATION RATE: 16 BRPM

## 2018-01-03 DIAGNOSIS — Z85.820 PERSONAL HISTORY OF MALIGNANT MELANOMA OF SKIN: Primary | ICD-10-CM

## 2018-01-03 DIAGNOSIS — L85.3 XEROSIS CUTIS: ICD-10-CM

## 2018-01-03 DIAGNOSIS — L90.5 SCAR CONDITION AND FIBROSIS OF SKIN: ICD-10-CM

## 2018-01-03 DIAGNOSIS — Z85.828 FOLLOW-UP SURVEILLANCE OF SKIN CANCER, ENCOUNTER FOR: ICD-10-CM

## 2018-01-03 DIAGNOSIS — L81.4 LENTIGINES: ICD-10-CM

## 2018-01-03 DIAGNOSIS — Z08 FOLLOW-UP SURVEILLANCE OF SKIN CANCER, ENCOUNTER FOR: ICD-10-CM

## 2018-01-03 DIAGNOSIS — D22.9 MULTIPLE BENIGN NEVI: ICD-10-CM

## 2018-01-03 DIAGNOSIS — L73.8 SEBACEOUS HYPERPLASIA OF FACE: ICD-10-CM

## 2018-01-03 DIAGNOSIS — L82.1 OTHER SEBORRHEIC KERATOSIS: ICD-10-CM

## 2018-01-03 DIAGNOSIS — D18.01 CHERRY ANGIOMA: ICD-10-CM

## 2018-01-03 NOTE — PROGRESS NOTES
Written by Harika Morris, as dictated by Klaudia Walker Area, [address]. Name: Sadie Riojas       Age: 67 y.o. Date: 1/3/2018    Chief Complaint:   Chief Complaint   Patient presents with    Skin Exam       Subjective:    HPI  Mr. Sadie Riojas is a 67 y.o. male who presents for a full skin exam.  The patient's last skin exam was on 2017 and the patient does not have current complaints related to his skin. He does still notice the lesion he presented originally to this office for on the right temporal scalp. This does not bother other than hitting it occasionally with his glasses. The patient's pertinent skin history includes : Stage IA malignant melanoma treated in the left upper arm in 2016; Breslow depth was 0.55 mm    ROS: Constitutional: Negative. Dermatological : negative    Social History     Social History    Marital status:      Spouse name: N/A    Number of children: N/A    Years of education: N/A     Occupational History    Not on file.      Social History Main Topics    Smoking status: Never Smoker    Smokeless tobacco: Never Used    Alcohol use No    Drug use: No    Sexual activity: No     Other Topics Concern    Not on file     Social History Narrative       Family History   Problem Relation Age of Onset    Other Mother      hypotension    Heart Failure Father     Cancer Father      Prostate    Other Father      PUD    Heart Disease Father      Heart Valve issue and replacement    Cancer Brother      Melanoma -  at 39yrs old   [facility] Cancer Sister      Hodgkin's in one, melanoma in other    Cancer Paternal Uncle      Prostate    Cancer Maternal Grandfather      Lung       Past Medical History:   Diagnosis Date    Family history of skin cancer     brother-melanoma    Hyperlipidemia     Hypertension     Sun-damaged skin     Thyroid disorder        Past Surgical History:   Procedure Laterality Date    HX TONSILLECTOMY      at 10 years old       Current Outpatient Prescriptions   Medication Sig Dispense Refill    BENICAR 40 mg tablet Take 40 mg by mouth once over twenty-four (24) hours.  carvedilol (COREG) 6.25 mg tablet Take 6.25 mg by mouth two (2) times a day. 4    aspirin delayed-release 81 mg tablet Take 81 mg by mouth daily.  pneumococcal 23-valent (PNEUMOVAX 23) 25 mcg/0.5 mL injection 0.5 mL by IntraMUSCular route PRIOR TO DISCHARGE for 1 dose. 0.5 mL 0       Allergies   Allergen Reactions    Codeine Other (comments)     Bad dreams         Objective:    Visit Vitals    /86 (BP 1 Location: Right arm, BP Patient Position: Sitting)    Pulse 66    Temp 97.9 °F (36.6 °C) (Oral)    Resp 16    Ht 6' 1.62\" (1.87 m)    Wt 96.6 kg (213 lb)    SpO2 95%    BMI 27.63 kg/m2       Kirk Salinas is a 67 y.o. male who appears well and in no distress. He is awake, alert, and oriented. There is no preauricular, submandibular, or cervical lymphadenopathy. A skin examination was performed including his scalp, face (including eyelids), ears, neck, chest, back, abdomen, upper extremities (including digits/nails), lower extremities, breasts, buttocks; genital skin was not examined. He has a well healed surgical site on his left upper arm without nodularity or pigment in the scar or surrounding skin to suggest recurrence. No associated left axillary lymphadenopathy. There are pink intradermal , pink and brown intradermal nevi and brown junctional nevi, no concerning features. There are scattered waxy macules and keratotic papules consistent with seborrheic keratoses. There are lentigines on sun exposed areas. He has scattered red papules consistent with cherry angiomas. He has sebaceous hyperplasia on his face. He has a cyst on his left buttock, mildly inflamed. He has dry/ scaly skin on his plantar surface of both feet. Assessment/Plan:    1. Personal history of skin cancer.   I discussed sun protection, sunscreen use, the warning signs of skin cancer, the need for self-skin examinations, and the need for regular practitioner exams every 3-4 months. The patient should follow up sooner as needed if new, changing, or symptomatic skin lesions arise. 2. Normal nevi. The diagnosis of normal nevi was reviewed. I discussed sun protection, sunscreen use, the warning signs of skin cancer, the need for self-skin examinations, and the need for regular practitioner exams every 3-4 months. The patient should follow up sooner as needed if new, changing, or symptomatic skin lesions arise. 3. Seborrheic keratoses. The diagnosis was reviewed and the patient was reassured that no treatment is needed for these benign lesions. 4. Solar lentigos. The diagnosis and relationship to sun exposure was reviewed. Sun protection advised. 5. Cherry angiomas. The diagnosis was reviewed and the patient was reassured that no treatment is needed for these benign lesions. 6. Sebaceous Hyperplasia, face. The diagnosis was discussed as well as the benign nature of this condition. The patient was reassured that no treatment is needed at this time. 7. Cyst, left buttock. The diagnosis was discussed. The patient was reassured - excision here if desired could be accomplished. 8. Dry skin, plantar feet. I recommended the use of Vaseline. This plan was reviewed with the patient and patient agrees. All questions were answered. This scribe documentation was reviewed by me and accurately reflects the examination and decisions made by me.

## 2018-01-03 NOTE — PROGRESS NOTES
Chief Complaint   Patient presents with    Skin Exam     1. Have you been to the ER, urgent care clinic since your last visit? Hospitalized since your last visit? No    2. Have you seen or consulted any other health care providers outside of the [address] [place] [address] since your last visit? Include any pap smears or colon screening. Seen Dr. Yulia Trejo for a routine annual follow up.

## 2018-01-03 NOTE — MR AVS SNAPSHOT
Visit Information Date & Time Provider Department Dept. Phone Encounter #  
 1/3/2018  1:00 PM Luis Jain NP [place] [phone] Upcoming Health Maintenance Date Due FOBT Q 1 YEAR AGE 50-75 2/16/1995 GLAUCOMA SCREENING Q2Y 2/16/2010 Pneumococcal 65+ High/Highest Risk (1 of 2 - PCV13) 2/16/2010 MEDICARE YEARLY EXAM 7/12/2018 DTaP/Tdap/Td series (2 - Td) 7/11/2027 Allergies as of 1/3/2018  Review Complete On: 1/3/2018 By: Reid Molina Severity Noted Reaction Type Reactions Codeine  03/21/2016    Other (comments) Bad dreams Current Immunizations  Never Reviewed No immunizations on file. Not reviewed this visit Vitals BP Pulse Temp Resp Height(growth percentile) Weight(growth percentile) 152/86 (BP 1 Location: Right arm, BP Patient Position: Sitting) 66 97.9 °F (36.6 °C) (Oral) 16 6' 1.62\" (1.87 m) 213 lb (96.6 kg) SpO2 BMI Smoking Status 95% 27.63 kg/m2 Never Smoker BMI and BSA Data Body Mass Index Body Surface Area  
 27.63 kg/m 2 2.24 m 2 Preferred Pharmacy Pharmacy Name Phone [id number] [phone] [facility], [address] [place] [address]  [phone] Your Updated Medication List  
  
   
This list is accurate as of: 1/3/18  1:00 PM.  Always use your most recent med list.  
  
  
  
  
 aspirin delayed-release 81 mg tablet Take 81 mg by mouth daily. BENICAR 40 mg tablet Generic drug:  olmesartan Take 40 mg by mouth once over twenty-four (24) hours. carvedilol 6.25 mg tablet Commonly known as:  Hailey Pates Take 6.25 mg by mouth two (2) times a day. pneumococcal 23-valent 25 mcg/0.5 mL injection Commonly known as:  PNEUMOVAX 23  
0.5 mL by IntraMUSCular route PRIOR TO DISCHARGE for 1 dose. Patient Instructions Self Skin Exam and Sunscreens Early detection and treatment is essential in the treatment of all forms of skin cancer. If caught early, all forms of skin cancer are curable. In addition to your regular visits, you should perform a monthly skin examination. Over time, you become familiar with what is normally found on your skin and can identify new or suspicious spots. One of the screening tools you can use to assess your skin is to follow the ABCDEs: 
 
A= Asymmetry (One half is unlike the other half) B= Border (An irregular, scalloped or poorly defined edge) C= Color (Is varied from one area to another, has shades of tan, brown/ black,       white, red or blue) D= Diameter (Spots larger than 6mm or a pencil eraser) E= Evolving (New spots or one that is changing in size, shape, or color) A follow- up interval will be customized based on your history of skin cancer or level of skin damage and risk factors. In any case, if you notice a suspicious or new spot, an appointment should be arranged between regular visits. Everyone should use sunscreen and sun-safe practices, which is especially important for those with a personal or family history of skin cancer. Suggestions for this include: 1. Use daily moisturizers containing SPF 30 or higher. 2. Wear long sleeve clothing with UPF ratings and a broad-brimmed hat. 3. Apply sunscreen with SPF 30 or higher to all sun exposed areas if you are going to be in the sun. A broad spectrum UVA/ UVB sunscreen is best.  Dont forget to REAPPLY every two hours or more often if swimming or sweating! 4. Avoid outside activities during peak sun hours, especially in the summer (10am- 2pm). 5. DO NOT use tanning beds. Using sunscreen and sun-safe practices can help reduce the likelihood of developing skin cancer or additional skin cancers in those previously diagnosed. Introducing [facility] & HEALTH SERVICES!    
 [facility] introduces [email or web address] patient portal. Now you can access parts of your medical record, email your doctor's office, and request medication refills online. 1. In your internet browser, go to [email or web address] 2. Click on the First Time User? Click Here link in the Sign In box. You will see the New Member Sign Up page. 3. Enter your Scholrly Access Code exactly as it appears below. You will not need to use this code after youve completed the sign-up process. If you do not sign up before the expiration date, you must request a new code. · Scholrly Access Code: [email or web address] Expires: 4/3/2018  1:00 PM 
 
4. Enter the last four digits of your Social Security Number (xxxx) and Date of Birth (mm/dd/yyyy) as indicated and click Submit. You will be taken to the next sign-up page. 5. Create a Scholrly ID. This will be your Scholrly login ID and cannot be changed, so think of one that is secure and easy to remember. 6. Create a Scholrly password. You can change your password at any time. 7. Enter your Password Reset Question and Answer. This can be used at a later time if you forget your password. 8. Enter your e-mail address. You will receive e-mail notification when new information is available in [address] 9. Click Sign Up. You can now view and download portions of your medical record. 10. Click the Download Summary menu link to download a portable copy of your medical information. If you have questions, please visit the Frequently Asked Questions section of the Scholrly website. Remember, Scholrly is NOT to be used for urgent needs. For medical emergencies, dial 911. Now available from your iPhone and Android! Please provide this summary of care documentation to your next provider. Your primary care clinician is listed as [address]. If you have any questions after today's visit, please call [phone].

## 2018-04-06 ENCOUNTER — TELEPHONE (OUTPATIENT)
Dept: FAMILY MEDICINE CLINIC | Age: 73
End: 2018-04-06

## 2018-04-06 NOTE — TELEPHONE ENCOUNTER
Patient is requesting that  reorder the pneumonia vaccine script for him. He did not have it done last July. Patient has scheduled his annual [address] for July 12. Best contact: [phone]    Thank you.

## 2018-04-16 ENCOUNTER — OFFICE VISIT (OUTPATIENT)
Dept: DERMATOLOGY | Facility: AMBULATORY SURGERY CENTER | Age: 73
End: 2018-04-16

## 2018-04-16 VITALS
SYSTOLIC BLOOD PRESSURE: 146 MMHG | WEIGHT: 213 LBS | TEMPERATURE: 98.2 F | HEART RATE: 85 BPM | HEIGHT: 74 IN | DIASTOLIC BLOOD PRESSURE: 90 MMHG | BODY MASS INDEX: 27.34 KG/M2 | OXYGEN SATURATION: 97 % | RESPIRATION RATE: 16 BRPM

## 2018-04-16 DIAGNOSIS — D18.01 CHERRY ANGIOMA: ICD-10-CM

## 2018-04-16 DIAGNOSIS — Z85.828 FOLLOW-UP SURVEILLANCE OF SKIN CANCER, ENCOUNTER FOR: ICD-10-CM
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
## 2018-04-16 DIAGNOSIS — D22.9 MULTIPLE BENIGN NEVI: Primary | ICD-10-CM

## 2018-04-16 DIAGNOSIS — L81.4 LENTIGINES: ICD-10-CM

## 2018-04-16 DIAGNOSIS — L90.5 SCAR CONDITION AND FIBROSIS OF SKIN: ICD-10-CM

## 2018-04-16 DIAGNOSIS — Z85.820 PERSONAL HISTORY OF MALIGNANT MELANOMA OF SKIN: ICD-10-CM

## 2018-04-16 DIAGNOSIS — L82.1 SEBORRHEIC KERATOSES: ICD-10-CM

## 2018-04-16 DIAGNOSIS — Z08 FOLLOW-UP SURVEILLANCE OF SKIN CANCER, ENCOUNTER FOR: ICD-10-CM

## 2018-04-16 NOTE — MR AVS SNAPSHOT
[facility] [address] 
[phone] Patient: Lynn Rios MRN: [id number] MTJ:[birth year] Visit Information Date & Time Provider Department Dept. Phone Encounter #  
 4/16/2018  1:00 PM DES [address] [phone] [id number] Your Appointments 7/12/2018  2:30 PM  
Medicare Physical with Scout Desouza MD  
[address] [facility]) Appt Note: mwv, last on 7/11/17  
 [address] [facility] 860 [address] [facility]  
  
   
 [address] [facility] [address] Upcoming Health Maintenance Date Due FOBT Q 1 YEAR AGE 50-75 2/16/1995 GLAUCOMA SCREENING Q2Y 2/16/2010 Pneumococcal 65+ High/Highest Risk (1 of 2 - PCV13) 2/16/2010 MEDICARE YEARLY EXAM 7/12/2018 DTaP/Tdap/Td series (2 - Td) 7/11/2027 Allergies as of 4/16/2018  Review Complete On: 4/16/2018 By: Octaviano Jara LPN Severity Noted Reaction Type Reactions Codeine  03/21/2016    Other (comments) Bad dreams Current Immunizations  Never Reviewed No immunizations on file. Not reviewed this visit Vitals BP Pulse Temp Resp Height(growth percentile) Weight(growth percentile) 146/90 (BP 1 Location: Right arm, BP Patient Position: Sitting) 85 98.2 °F (36.8 °C) (Oral) 16 6' 1.62\" (1.87 m) 213 lb (96.6 kg) SpO2 BMI Smoking Status 97% 27.63 kg/m2 Never Smoker Vitals History BMI and BSA Data Body Mass Index Body Surface Area  
 27.63 kg/m 2 2.24 m 2 Preferred Pharmacy Pharmacy Name Phone [id number] [phone], [address] [place] [address]  [phone] Your Updated Medication List  
  
   
This list is accurate as of 4/16/18  1:02 PM.  Always use your most recent med list.  
  
  
  
  
 aspirin delayed-release 81 mg tablet Take 81 mg by mouth daily. BENICAR 40 mg tablet Generic drug:  olmesartan Take 40 mg by mouth once over twenty-four (24) hours. carvedilol 6.25 mg tablet Commonly known as:  Jozef Heal Take 6.25 mg by mouth two (2) times a day. pneumococcal 23-valent 25 mcg/0.5 mL injection Commonly known as:  PNEUMOVAX 23  
0.5 mL by IntraMUSCular route PRIOR TO DISCHARGE for 1 dose. Patient Instructions Self Skin Exam and Sunscreens Early detection and treatment is essential in the treatment of all forms of skin cancer. If caught early, all forms of skin cancer are curable. In addition to your regular visits, you should perform a monthly skin examination. Over time, you become familiar with what is normally found on your skin and can identify new or suspicious spots. One of the screening tools you can use to assess your skin is to follow the ABCDEs: 
 
A= Asymmetry (One half is unlike the other half) B= Border (An irregular, scalloped or poorly defined edge) C= Color (Is varied from one area to another, has shades of tan, brown/ black,       white, red or blue) D= Diameter (Spots larger than 6mm or a pencil eraser) E= Evolving (New spots or one that is changing in size, shape, or color) A follow- up interval will be customized based on your history of skin cancer or level of skin damage and risk factors. In any case, if you notice a suspicious or new spot, an appointment should be arranged between regular visits. Everyone should use sunscreen and sun-safe practices, which is especially important for those with a personal or family history of skin cancer. Suggestions for this include: 1. Use daily moisturizers containing SPF 30 or higher. 2. Wear long sleeve clothing with UPF ratings and a broad-brimmed hat. 3. Apply sunscreen with SPF 30 or higher to all sun exposed areas if you are going to be in the sun.   A broad spectrum UVA/ UVB sunscreen is best. Dont forget to REAPPLY every two hours or more often if swimming or sweating! 4. Avoid outside activities during peak sun hours, especially in the summer (10am- 2pm). 5. DO NOT use tanning beds. Using sunscreen and sun-safe practices can help reduce the likelihood of developing skin cancer or additional skin cancers in those previously diagnosed. Introducing [facility] & HEALTH SERVICES! [facility] introduces Partigi patient portal. Now you can access parts of your medical record, email your doctor's office, and request medication refills online. 1. In your internet browser, go to [email or web address] 2. Click on the First Time User? Click Here link in the Sign In box. You will see the New Member Sign Up page. 3. Enter your Partigi Access Code exactly as it appears below. You will not need to use this code after youve completed the sign-up process. If you do not sign up before the expiration date, you must request a new code. · Partigi Access Code: [email or web address] Expires: 7/15/2018  1:02 PM 
 
4. Enter the last four digits of your Social Security Number (xxxx) and Date of Birth (mm/dd/yyyy) as indicated and click Submit. You will be taken to the next sign-up page. 5. Create a Partigi ID. This will be your Partigi login ID and cannot be changed, so think of one that is secure and easy to remember. 6. Create a Partigi password. You can change your password at any time. 7. Enter your Password Reset Question and Answer. This can be used at a later time if you forget your password. 8. Enter your e-mail address. You will receive e-mail notification when new information is available in [address] 9. Click Sign Up. You can now view and download portions of your medical record. 10. Click the Download Summary menu link to download a portable copy of your medical information.  
 
If you have questions, please visit the Frequently Asked Questions section of the [employer]. Remember, [employer] is NOT to be used for urgent needs. For medical emergencies, dial 911. Now available from your iPhone and Android! Please provide this summary of care documentation to your next provider. Your primary care clinician is listed as [address]. If you have any questions after today's visit, please call [phone].

## 2018-04-16 NOTE — PROGRESS NOTES
Name: Margarita Magana       Age: 68 y.o. Date: 2018    Chief Complaint:   Chief Complaint   Patient presents with    Skin Exam       Subjective:    HPI  Mr. Margarita Magana is a 68 y.o. male who presents for a full skin exam.  The patient's last skin exam was on 1/3/18 and the patient does have current complaints related to his skin. He reports pulling off a tick on the left hip recently but believes the area is doing fine. 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 Fair skin. ROS: Constitutional: Negative. Dermatological : Tick bite. Social History     Social History    Marital status:      Spouse name: N/A    Number of children: N/A    Years of education: N/A     Occupational History    Not on file. Social History Main Topics    Smoking status: Never Smoker    Smokeless tobacco: Never Used    Alcohol use No    Drug use: No    Sexual activity: No     Other Topics Concern    Not on file     Social History Narrative       Family History   Problem Relation Age of Onset    Other Mother      hypotension    Heart Failure Father     Cancer Father      Prostate    Other Father      PUD    Heart Disease Father      Heart Valve issue and replacement    Cancer Brother      Melanoma -  at 39yrs old   [facility] Cancer Sister      Hodgkin's in one, melanoma in other    Cancer Paternal Uncle      Prostate    Cancer Maternal Grandfather      Lung       Past Medical History:   Diagnosis Date    Family history of skin cancer     brother-melanoma    Hyperlipidemia     Hypertension     Sun-damaged skin     Thyroid disorder        Past Surgical History:   Procedure Laterality Date    HX TONSILLECTOMY      at 10years old       Current Outpatient Prescriptions   Medication Sig Dispense Refill    BENICAR 40 mg tablet Take 40 mg by mouth once over twenty-four (24) hours.       carvedilol (COREG) 6.25 mg tablet Take 6.25 mg by mouth two (2) times a day. 4    aspirin delayed-release 81 mg tablet Take 81 mg by mouth daily.  pneumococcal 23-valent (PNEUMOVAX 23) 25 mcg/0.5 mL injection 0.5 mL by IntraMUSCular route PRIOR TO DISCHARGE for 1 dose. 0.5 mL 0       Allergies   Allergen Reactions    Codeine Other (comments)     Bad dreams         Objective:    Visit Vitals    /90 (BP 1 Location: Right arm, BP Patient Position: Sitting)    Pulse 85    Temp 98.2 °F (36.8 °C) (Oral)    Resp 16    Ht 6' 1.62\" (1.87 m)    Wt 96.6 kg (213 lb)    SpO2 97%    BMI 27.63 kg/m2       Germaine Carl is a 68 y.o. male who appears well and in no distress. He is awake, alert, and oriented. There is no preauricular, submandibular, or cervical lymphadenopathy. A skin examination was performed including his scalp, face (including eyelids), ears, neck, chest, back, abdomen, upper extremities (including digits/nails), lower extremities, breasts, buttocks; genital skin was not examined. He has fair skin with lentigines on sun exposed areas. There are scattered stuck on waxy macules and keratotic papules consistent with seborrheic keratoses. He has scattered cherry angiomas including a larger one on the right frontal scalp. He is a well-healed scar in the left upper arm, distally, no evidence of lesion recurrence-specifically no nodularity or pigment in the scar surrounding skin tissue. There is no palpable left axillary lymphadenopathy. He has medium brown junctional nevi without concerning features as well as some that are pink and intradermal that are also without severe atypia evidence. He has a mobile cystic structure on the left buttock without inflammation. There is an inflammatory appearing pink papule on the left lateral hip most consistent with actinic granuloma. He has dilated veins in the legs.   No lesions of concern today for skin cancer or recurrent skin cancers. Assessment/Plan:  1. Normal nevi. The diagnosis of normal nevi was reviewed. I discussed sun protection, sunscreen use, the warning signs of skin cancer, the need for self-skin examinations, and the need for regular practitioner exams every 3 months. The patient should follow up sooner as needed if new, changing, or symptomatic skin lesions arise. 2.Seborrheic keratoses. The diagnosis was reviewed and the patient was reassured that no treatment is needed for these benign lesions. 3.Cherry angiomas. The diagnosis was reviewed and the patient was reassured that no treatment is needed for these benign lesions. 4. Solar lentigos. The diagnosis and relationship to sun exposure was reviewed. Sun protection advised. 5. Personal history of skin cancer. I discussed sun protection, sunscreen use, the warning signs of skin cancer, the need for self-skin examinations, and the need for regular practitioner exams every 3 months. The patient should follow up sooner as needed if new, changing, or symptomatic skin lesions arise. 6. Solar lentigos. The diagnosis and relationship to sun exposure was reviewed. Sun protection advised. 7.  Cyst of skin left buttock. There is no inflammation. Discussed surgical removal if desired. 8. Granuloma left hip s/p tick bite. No target rash, appears to be completely removed when viewed with the dermatoscope.

## 2018-07-06 ENCOUNTER — TELEPHONE (OUTPATIENT)
Dept: FAMILY MEDICINE CLINIC | Age: 73
End: 2018-07-06

## 2018-07-06 DIAGNOSIS — Z12.5 SCREENING FOR PROSTATE CANCER: ICD-10-CM

## 2018-07-06 DIAGNOSIS — Z13.220 SCREENING FOR HYPERLIPIDEMIA: Primary | ICD-10-CM

## 2018-07-06 DIAGNOSIS — R35.1 NOCTURIA: ICD-10-CM

## 2018-07-06 DIAGNOSIS — Z13.0 SCREENING FOR DEFICIENCY ANEMIA: ICD-10-CM

## 2018-07-06 NOTE — TELEPHONE ENCOUNTER
Patient came by stating that he has a [address] on 7/12/18 and wanted to know if labs needed to be done. If so, patient requested to come in Monday.

## 2018-07-09 NOTE — TELEPHONE ENCOUNTER
Please place orders so pt can have labs done in the morning. Attempted to return call to the pt and his wife, however, neither phone has voicemail. I will continue to try and reach him. Thanks.

## 2018-07-10 NOTE — TELEPHONE ENCOUNTER
Pt needs a call once labs are in the system and needs to know if he can get them done today, please call pt and advise hope to have the results back before 7/12/18 appt

## 2018-07-11 ENCOUNTER — LAB ONLY (OUTPATIENT)
Dept: FAMILY MEDICINE CLINIC | Age: 73
End: 2018-07-11

## 2018-07-12 ENCOUNTER — OFFICE VISIT (OUTPATIENT)
Dept: FAMILY MEDICINE CLINIC | Age: 73
End: 2018-07-12

## 2018-07-12 VITALS
SYSTOLIC BLOOD PRESSURE: 140 MMHG | HEART RATE: 66 BPM | WEIGHT: 218.2 LBS | OXYGEN SATURATION: 97 % | RESPIRATION RATE: 20 BRPM | HEIGHT: 74 IN | TEMPERATURE: 97.5 F | BODY MASS INDEX: 28 KG/M2 | DIASTOLIC BLOOD PRESSURE: 80 MMHG

## 2018-07-12 DIAGNOSIS — I48.0 PAROXYSMAL ATRIAL FIBRILLATION (HCC): ICD-10-CM

## 2018-07-12 DIAGNOSIS — Z12.11 SCREENING FOR COLON CANCER: ICD-10-CM

## 2018-07-12 DIAGNOSIS — C43.62 MELANOMA OF LEFT UPPER ARM (HCC): ICD-10-CM

## 2018-07-12 DIAGNOSIS — Z13.6 SCREENING FOR AAA (ABDOMINAL AORTIC ANEURYSM): ICD-10-CM

## 2018-07-12 DIAGNOSIS — Z00.00 MEDICARE ANNUAL WELLNESS VISIT, SUBSEQUENT: Primary | ICD-10-CM

## 2018-07-12 DIAGNOSIS — R97.20 ELEVATED PROSTATE SPECIFIC ANTIGEN (PSA): ICD-10-CM

## 2018-07-12 LAB
ALBUMIN SERPL-MCNC: 4.3 G/DL (ref 3.5–4.8)
ALBUMIN/GLOB SERPL: 1.6 {RATIO} (ref 1.2–2.2)
ALP SERPL-CCNC: 78 IU/L (ref 39–117)
ALT SERPL-CCNC: 10 IU/L (ref 0–44)
AST SERPL-CCNC: 14 IU/L (ref 0–40)
BASOPHILS # BLD AUTO: 0 X10E3/UL (ref 0–0.2)
BASOPHILS NFR BLD AUTO: 1 %
BILIRUB SERPL-MCNC: 0.7 MG/DL (ref 0–1.2)
BUN SERPL-MCNC: 15 MG/DL (ref 8–27)
BUN/CREAT SERPL: 15 (ref 10–24)
CALCIUM SERPL-MCNC: 9.5 MG/DL (ref 8.6–10.2)
CHLORIDE SERPL-SCNC: 99 MMOL/L (ref 96–106)
CHOLEST SERPL-MCNC: 158 MG/DL (ref 100–199)
CO2 SERPL-SCNC: 24 MMOL/L (ref 20–29)
CREAT SERPL-MCNC: 0.98 MG/DL (ref 0.76–1.27)
CRP SERPL HS-MCNC: 3.96 MG/L (ref 0–3)
EOSINOPHIL # BLD AUTO: 0.1 X10E3/UL (ref 0–0.4)
EOSINOPHIL NFR BLD AUTO: 2 %
ERYTHROCYTE [DISTWIDTH] IN BLOOD BY AUTOMATED COUNT: 14 % (ref 12.3–15.4)
GLOBULIN SER CALC-MCNC: 2.7 G/DL (ref 1.5–4.5)
GLUCOSE SERPL-MCNC: 93 MG/DL (ref 65–99)
HCT VFR BLD AUTO: 46 % (ref 37.5–51)
HDLC SERPL-MCNC: 43 MG/DL
HGB BLD-MCNC: 14.5 G/DL (ref 13–17.7)
IMM GRANULOCYTES # BLD: 0 X10E3/UL (ref 0–0.1)
IMM GRANULOCYTES NFR BLD: 0 %
INTERPRETATION, 910389: NORMAL
LDLC SERPL CALC-MCNC: 100 MG/DL (ref 0–99)
LYMPHOCYTES # BLD AUTO: 1.5 X10E3/UL (ref 0.7–3.1)
LYMPHOCYTES NFR BLD AUTO: 25 %
MCH RBC QN AUTO: 29.8 PG (ref 26.6–33)
MCHC RBC AUTO-ENTMCNC: 31.5 G/DL (ref 31.5–35.7)
MCV RBC AUTO: 95 FL (ref 79–97)
MONOCYTES # BLD AUTO: 0.7 X10E3/UL (ref 0.1–0.9)
MONOCYTES NFR BLD AUTO: 12 %
NEUTROPHILS # BLD AUTO: 3.6 X10E3/UL (ref 1.4–7)
NEUTROPHILS NFR BLD AUTO: 60 %
PLATELET # BLD AUTO: 278 X10E3/UL (ref 150–379)
POTASSIUM SERPL-SCNC: 4 MMOL/L (ref 3.5–5.2)
PROT SERPL-MCNC: 7 G/DL (ref 6–8.5)
PSA SERPL-MCNC: 2.6 NG/ML (ref 0–4)
RBC # BLD AUTO: 4.86 X10E6/UL (ref 4.14–5.8)
SODIUM SERPL-SCNC: 140 MMOL/L (ref 134–144)
TRIGL SERPL-MCNC: 73 MG/DL (ref 0–149)
VLDLC SERPL CALC-MCNC: 15 MG/DL (ref 5–40)
WBC # BLD AUTO: 5.9 X10E3/UL (ref 3.4–10.8)

## 2018-07-12 RX ORDER — PNEUMOCOCCAL 13-VALENT CONJUGATE VACCINE 2.2; 2.2; 2.2; 2.2; 2.2; 4.4; 2.2; 2.2; 2.2; 2.2; 2.2; 2.2; 2.2 UG/.5ML; UG/.5ML; UG/.5ML; UG/.5ML; UG/.5ML; UG/.5ML; UG/.5ML; UG/.5ML; UG/.5ML; UG/.5ML; UG/.5ML; UG/.5ML; UG/.5ML
INJECTION, SUSPENSION INTRAMUSCULAR
Refills: 0 | COMMUNITY
Start: 2018-04-16 | End: 2019-02-01 | Stop reason: CLARIF

## 2018-07-12 NOTE — PROGRESS NOTES
.sjme  This is the Subsequent Medicare Annual Wellness Exam, performed 12 months or more after the Initial AWV or the last Subsequent AWV    I have reviewed the patient's medical history in detail and updated the computerized patient record. History     Past Medical History:   Diagnosis Date    Family history of skin cancer     brother-melanoma    Hyperlipidemia     Hypertension     Sun-damaged skin     Thyroid disorder       Past Surgical History:   Procedure Laterality Date    HX TONSILLECTOMY      at 10years old     Current Outpatient Prescriptions   Medication Sig Dispense Refill    BENICAR 40 mg tablet Take 40 mg by mouth once over twenty-four (24) hours.  carvedilol (COREG) 6.25 mg tablet Take 6.25 mg by mouth two (2) times a day. 4    aspirin delayed-release 81 mg tablet Take 81 mg by mouth daily.  pneumococcal 23-valent (PNEUMOVAX 23) 25 mcg/0.5 mL injection 0.5 mL by IntraMUSCular route PRIOR TO DISCHARGE for 1 dose.  0.5 mL 0     Allergies   Allergen Reactions    Codeine Other (comments)     Bad dreams     Family History   Problem Relation Age of Onset    Other Mother      hypotension    Heart Failure Father     Cancer Father      Prostate    Other Father      PUD    Heart Disease Father      Heart Valve issue and replacement    Cancer Brother      Melanoma -  at 39yrs old   [facility] Cancer Sister      Hodgkin's in one, melanoma in other   [facility] Cancer Paternal Uncle      Prostate    Cancer Maternal Grandfather      Lung     Social History   Substance Use Topics    Smoking status: Never Smoker    Smokeless tobacco: Never Used    Alcohol use No     Patient Active Problem List   Diagnosis Code    Elevated TSH R94.6    Melanoma of left upper arm ([facility] [address] C43.62       Depression Risk Factor Screening:     PHQ over the last two weeks 2018   Little interest or pleasure in doing things Not at all   Feeling down, depressed or hopeless Not at all   Total Score PHQ 2 0     Alcohol Risk Factor Screening: You do not drink alcohol or very rarely. Functional Ability and Level of Safety:   Hearing Loss  The patient needs further evaluation. Activities of Daily Living  The home contains: handrails and grab bars  Patient does total self care    Fall Risk  Fall Risk Assessment, last 12 mths 7/12/2018   Able to walk? Yes   Fall in past 12 months? No   Fall with injury? -   Number of falls in past 12 months -   Fall Risk Score -       Abuse Screen  Patient is not abused    Cognitive Screening   Evaluation of Cognitive Function:  Has your family/caregiver stated any concerns about your memory: no  Normal    Patient Care Team   Patient Care Team:  Kayleen Crocker MD as PCP - General (Pediatrics)    Assessment/Plan   Education and counseling provided:  Are appropriate based on today's review and evaluation  End-of-Life planning (with patient's consent)  Pneumococcal Vaccine  Influenza Vaccine  Prostate cancer screening tests (PSA, covered annually)  Colorectal cancer screening tests  Cardiovascular screening blood test  Screening for glaucoma    Lab Results   Component Value Date/Time    Prostate Specific Ag 2.6 07/11/2018 08:32 AM    Prostate Specific Ag 2.4 07/11/2017 09:37 AM    Prostate Specific Ag 1.4 03/21/2016 10:27 AM       Health Maintenance Due   Topic Date Due    FOBT Q 1 YEAR AGE 50-75  Discussed/Ordered    GLAUCOMA SCREENING Q2Y  Discussed    Pneumococcal 65+ High/Highest Risk (1 of 2 - PCV13) Discussed    MEDICARE YEARLY EXAM  7/13/2019       ICD-10-CM ICD-9-CM    1. Medicare annual wellness visit, subsequent Z00.00 V70.0 Anticipatory guidance discussed. Immunizations reviewed  HM updated. 2. Elevated prostate specific antigen (PSA) R97.20 790.93 REFERRAL TO UROLOGY   3. Screening for colon cancer Z12.11 V76.51 OCCULT BLOOD, IMMUNOASSAY (FIT)   4. Screening for AAA (abdominal aortic aneurysm) Z13.6 V81.2 US EXAM SCREENING AAA   5.  Melanoma of left upper arm (HCC) C43.62 172.6 6. Paroxysmal atrial fibrillation (HCC) I48.0 427.31      73M who presents for annual wellness visit. I note that his PSA is risen again. Given that it has risen more than 0.74 in the past 2-years, though it is in the normal range, will still send to urology for thorough evaluation. Discussed the basis for this and also will order and occult (FIT). Risk/benefits of colonoscopy discussed in detail. I have discussed the diagnosis with the patient and the intended treatment plan as seen in the above orders. The patient has received an after-visit summary and questions were answered concerning future plans. Asked to return should symptoms worsen or not improve with treatment. Any pending labs and studies will be relayed to patient when they become available. Pt verbalizes understanding of plan of care and denies further questions or concerns at this time. Follow-up Disposition:  Return in about 1 year (around 7/12/2019), or if symptoms worsen or fail to improve, for Recheck BP in 1 month.

## 2018-07-12 NOTE — PROGRESS NOTES
Identified pt with two pt identifiers(name and ). Chief Complaint   Patient presents with   [address] Visit     Medicare Wellness Visit        Health Maintenance Due   Topic    FOBT Q 1 YEAR AGE 50-75     GLAUCOMA SCREENING Q2Y     Pneumococcal 65+ High/Highest Risk (1 of 2 - PCV13)    MEDICARE YEARLY EXAM        Wt Readings from Last 3 Encounters:   18 213 lb (96.6 kg)   18 213 lb (96.6 kg)   17 218 lb (98.9 kg)     Temp Readings from Last 3 Encounters:   18 98.2 °F (36.8 °C) (Oral)   18 97.9 °F (36.6 °C) (Oral)   17 98.1 °F (36.7 °C) (Oral)     BP Readings from Last 3 Encounters:   18 146/90   18 152/86   17 155/85     Pulse Readings from Last 3 Encounters:   18 85   18 66   17 73         Learning Assessment:  :     Learning Assessment 2016 2016 3/21/2016   PRIMARY LEARNER Patient Patient Patient   HIGHEST LEVEL OF EDUCATION - PRIMARY LEARNER  - - 4 YEARS OF COLLEGE   BARRIERS PRIMARY LEARNER - NONE NONE   CO-LEARNER CAREGIVER - No No   PRIMARY LANGUAGE ENGLISH ENGLISH ENGLISH   LEARNER PREFERENCE PRIMARY DEMONSTRATION DEMONSTRATION DEMONSTRATION     - - READING     - - LISTENING   LEARNING SPECIAL TOPICS no no -   ANSWERED BY patient patient patient   RELATIONSHIP SELF SELF SELF   ASSESSMENT COMMENT none none -       Depression Screening:  :     PHQ over the last two weeks 2018   Little interest or pleasure in doing things Not at all   Feeling down, depressed or hopeless Not at all   Total Score PHQ 2 0       Fall Risk Assessment:  :     Fall Risk Assessment, last 12 mths 2018   Able to walk? Yes   Fall in past 12 months? No   Fall with injury? -   Number of falls in past 12 months -   Fall Risk Score -       Abuse Screening:  :     Abuse Screening Questionnaire 2017   Do you ever feel afraid of your partner? N   Are you in a relationship with someone who physically or mentally threatens you?  N   Is it safe for you to go home? Y       Coordination of Care Questionnaire:  :     1) Have you been to an emergency room, urgent care clinic since your last visit? no   Hospitalized since your last visit? no             2) Have you seen or consulted any other health care providers outside of [address] [place] [address] since your last visit? Yes Cardiology  (Include any pap smears or colon screenings in this section.)    3) Do you have an Advance Directive on file? yes  Are you interested in receiving information about Advance Directives? no    Reviewed record in preparation for visit and have obtained necessary documentation. Medication reconciliation up to date and corrected with patient at this time. Patient advises that glaucoma screening and pneumonia vaccine have been given. Patient will bring paperwork to be scanned.

## 2018-07-12 NOTE — PATIENT INSTRUCTIONS

## 2018-07-12 NOTE — MR AVS SNAPSHOT
[facility] 
 
 
 [address] [facility] 
[phone] Patient: Margoth Fountain MRN: [id number] [facility]:[birth year] Visit Information Date & Time Provider Department Dept. Phone Encounter #  
 7/12/2018  2:30 PM Jacob Tucker [phone] Follow-up Instructions Return in about 1 year (around 7/12/2019) for Recheck BP in 1 month. Your Appointments 7/16/2018  9:30 AM  
Office Visit with DES Manriquez [address] [place] [address]) Appt Note: skin exam; 3 month skin exam  
 [facility] [address]  
[address] [place] [id number] Upcoming Health Maintenance Date Due FOBT Q 1 YEAR AGE 50-75 2/16/1995 GLAUCOMA SCREENING Q2Y 2/16/2010 Pneumococcal 65+ High/Highest Risk (1 of 2 - PCV13) 2/16/2010 Influenza Age 5 to Adult 8/1/2018 MEDICARE YEARLY EXAM 7/13/2019 DTaP/Tdap/Td series (2 - Td) 7/11/2027 Allergies as of 7/12/2018  Review Complete On: 7/12/2018 By: William Fowler LPN Severity Noted Reaction Type Reactions Codeine  03/21/2016    Other (comments) Bad dreams Current Immunizations  Reviewed on 7/12/2018 No immunizations on file. Reviewed by Little Fajardo MD on 7/12/2018 at  3:19 PM  
You Were Diagnosed With   
  
 Codes Comments Medicare annual wellness visit, subsequent    -  Primary ICD-10-CM: Z00.00 ICD-9-CM: V70.0 Elevated prostate specific antigen (PSA)     ICD-10-CM: R97.20 ICD-9-CM: 790.93 Screening for colon cancer     ICD-10-CM: Z12.11 ICD-9-CM: V76.51 Screening for AAA (abdominal aortic aneurysm)     ICD-10-CM: Z13.6 ICD-9-CM: V81.2 Melanoma of left upper arm ([address]     ICD-10-CM: C43.62 
ICD-9-CM: 172.6 Paroxysmal atrial fibrillation (HCC)     ICD-10-CM: I48.0 ICD-9-CM: 427.31 Vitals BP Pulse Temp Resp Height(growth percentile) Weight(growth percentile) 140/80 (BP 1 Location: Left arm, BP Patient Position: Sitting) 66 97.5 °F (36.4 °C) (Oral) 20 6' 1.75\" (1.873 m) 218 lb 3.2 oz (99 kg) SpO2 BMI Smoking Status 97% 28.21 kg/m2 Never Smoker BMI and BSA Data Body Mass Index Body Surface Area  
 28.21 kg/m 2 2.27 m 2 Preferred Pharmacy Pharmacy Name Phone [id number] [address], [address] [place] [address]  [phone] Your Updated Medication List  
  
   
This list is accurate as of 7/12/18  3:40 PM.  Always use your most recent med list.  
  
  
  
  
 aspirin delayed-release 81 mg tablet Take 81 mg by mouth daily. BENICAR 40 mg tablet Generic drug:  olmesartan Take 40 mg by mouth once over twenty-four (24) hours. carvedilol 6.25 mg tablet Commonly known as:  [place] Locket Take 6.25 mg by mouth two (2) times a day. pneumococcal 23-valent 25 mcg/0.5 mL injection Commonly known as:  PNEUMOVAX 23  
0.5 mL by IntraMUSCular route PRIOR TO DISCHARGE for 1 dose. PREVNAR 13 (PF) 0.5 mL Syrg injection Generic drug:  pneumococcal 13 zarina conj dip TO BE ADMINISTERED BY PHARMACIST FOR IMMUNIZATION We Performed the Following OCCULT BLOOD, IMMUNOASSAY (FIT) [id number] CPT(R)] REFERRAL TO UROLOGY [KJZ900 Custom] Comments:  
 Healthy 68 male who has had a 2-year jump in his PSA from 1.4 to 2.4 and now 2.6. No signs of prostate enlargement per se. But, there is a strong family history of prostate cancer in his father and his paternal uncle. Follow-up Instructions Return in about 1 year (around 7/12/2019) for Recheck BP in 1 month. To-Do List   
 07/19/2018 Imaging:  US EXAM SCREENING AAA Referral Information  Referral ID Referred By Referred To  
  
 [id number] Farrukh Evans MD   
   [address] [place] [address]    
 [address] Phone: [phone] Fax: [phone] Visits Status Start Date End Date 1 New Request 7/12/18 7/12/19 If your referral has a status of pending review or denied, additional information will be sent to support the outcome of this decision. Patient Instructions Well Visit, Over 72: Care Instructions Your Care Instructions Physical exams can help you stay healthy. Your doctor has checked your overall health and may have suggested ways to take good care of yourself. He or she also may have recommended tests. At home, you can help prevent illness with healthy eating, regular exercise, and other steps. Follow-up care is a key part of your treatment and safety. Be sure to make and go to all appointments, and call your doctor if you are having problems. It's also a good idea to know your test results and keep a list of the medicines you take. How can you care for yourself at home? · Reach and stay at a healthy weight. This will lower your risk for many problems, such as obesity, diabetes, heart disease, and high blood pressure. · Get at least 30 minutes of exercise on most days of the week. Walking is a good choice. You also may want to do other activities, such as running, swimming, cycling, or playing tennis or team sports. · Do not smoke. Smoking can make health problems worse. If you need help quitting, talk to your doctor about stop-smoking programs and medicines. These can increase your chances of quitting for good. · Protect your skin from too much sun. When you're outdoors from 10 a.m. to 4 p.m., stay in the shade or cover up with clothing and a hat with a wide brim. Wear sunglasses that block UV rays. Even when it's cloudy, put broad-spectrum sunscreen (SPF 30 or higher) on any exposed skin. · See a dentist one or two times a year for checkups and to have your teeth cleaned. · Wear a seat belt in the car. · Limit alcohol to 2 drinks a day for men and 1 drink a day for women. Too much alcohol can cause health problems. Follow your doctor's advice about when to have certain tests. These tests can spot problems early. For men and women · Cholesterol. Your doctor will tell you how often to have this done based on your overall health and other things that can increase your risk for heart attack and stroke. · Blood pressure. Have your blood pressure checked during a routine doctor visit. Your doctor will tell you how often to check your blood pressure based on your age, your blood pressure results, and other factors. · Diabetes. Ask your doctor whether you should have tests for diabetes. · Vision. Experts recommend that you have yearly exams for glaucoma and other age-related eye problems. · Hearing. Tell your doctor if you notice any change in your hearing. You can have tests to find out how well you hear. · Colon cancer tests. Keep having colon cancer tests as your doctor recommends. You can have one of several types of tests. · Heart attack and stroke risk. At least every 4 to 6 years, you should have your risk for heart attack and stroke assessed. Your doctor uses factors such as your age, blood pressure, cholesterol, and whether you smoke or have diabetes to show what your risk for a heart attack or stroke is over the next 10 years. · Osteoporosis. Talk to your doctor about whether you should have a bone density test to find out whether you have thinning bones. Also ask your doctor about whether you should take calcium and vitamin D supplements. For women · Pap test and pelvic exam. You may no longer need a Pap test. Talk with your doctor about whether to stop or continue to have Pap tests. · Breast exam and mammogram. Ask how often you should have a mammogram, which is an X-ray of your breasts. A mammogram can spot breast cancer before it can be felt and when it is easiest to treat. · Thyroid disease. Talk to your doctor about whether to have your thyroid checked as part of a regular physical exam. Women have an increased chance of a thyroid problem. For men · Prostate exam. Talk to your doctor about whether you should have a blood test (called a PSA test) for prostate cancer. Experts disagree on whether men should have this test. Some experts recommend that you discuss the benefits and risks of the test with your doctor. · Abdominal aortic aneurysm. Ask your doctor whether you should have a test to check for an aneurysm. You may need a test if you ever smoked or if your parent, brother, sister, or child has had an aneurysm. When should you call for help? Watch closely for changes in your health, and be sure to contact your doctor if you have any problems or symptoms that concern you. Where can you learn more? Go to http://allie-valdemar.info/. Enter G579 in the search box to learn more about \"Well Visit, Over 65: Care Instructions. \" Current as of: May 16, 2017 Content Version: 11.7 © 1367-0205 [employer]. Care instructions adapted under license by [employer] (which disclaims liability or warranty for this information). If you have questions about a medical condition or this instruction, always ask your healthcare professional. [address] any warranty or liability for your use of this information. Introducing [facility]! Luis Felipe Ibrahim introduces [employer] patient portal. Now you can access parts of your medical record, email your doctor's office, and request medication refills online. 1. In your internet browser, go to [email or web address] 2. Click on the First Time User? Click Here link in the Sign In box. You will see the New Member Sign Up page. 3. Enter your [employer] Access Code exactly as it appears below.  You will not need to use this code after youve completed the sign-up process. If you do not sign up before the expiration date, you must request a new code. · [employer] Access Code: [email or web address] Expires: 7/15/2018  1:02 PM 
 
4. Enter the last four digits of your Social Security Number (xxxx) and Date of Birth (mm/dd/yyyy) as indicated and click Submit. You will be taken to the next sign-up page. 5. Create a [employer] ID. This will be your [employer] login ID and cannot be changed, so think of one that is secure and easy to remember. 6. Create a [employer] password. You can change your password at any time. 7. Enter your Password Reset Question and Answer. This can be used at a later time if you forget your password. 8. Enter your e-mail address. You will receive e-mail notification when new information is available in [address]. 9. Click Sign Up. You can now view and download portions of your medical record. 10. Click the Download Summary menu link to download a portable copy of your medical information. If you have questions, please visit the Frequently Asked Questions section of the [employer] website. Remember, [employer] is NOT to be used for urgent needs. For medical emergencies, dial 911. Now available from your iPhone and Android! Please provide this summary of care documentation to your next provider. Your primary care clinician is listed as [address]. If you have any questions after today's visit, please call [phone].

## 2018-07-12 NOTE — ASSESSMENT & PLAN NOTE
Key Oncology Meds     Patient is on no Oncologic meds. Key Pain Meds     The patient is on no pain meds. Lab Results   Component Value Date/Time    WBC 5.9 07/11/2018 08:32 AM    ABS.  NEUTROPHILS 3.6 07/11/2018 08:32 AM    HGB 14.5 07/11/2018 08:32 AM    HCT 46.0 07/11/2018 08:32 AM    PLATELET 483 52/77/2404 08:32 AM    Creatinine 0.98 07/11/2018 08:32 AM    BUN 15 07/11/2018 08:32 AM    ALT (SGPT) 10 07/11/2018 08:32 AM    AST (SGOT) 14 07/11/2018 08:32 AM    Albumin 4.3 07/11/2018 08:32 AM    Prostate Specific Ag 2.6 07/11/2018 08:32 AM

## 2018-07-12 NOTE — ASSESSMENT & PLAN NOTE
This condition is managed by Specialist.  Key CAD CHF Meds             BENICAR 40 mg tablet  (Taking) Take 40 mg by mouth once over twenty-four (24) hours. carvedilol (COREG) 6.25 mg tablet  (Taking) Take 6.25 mg by mouth two (2) times a day. aspirin delayed-release 81 mg tablet  (Taking) Take 81 mg by mouth daily.         [id number](BNP,BNPP,BNPPPOC,HSCRP,NA,NAPOC,K,KPOCT,CHOL,CHOLPOCT,HDL,HDLPOC,LDLCHOL,LDLPOCT,LDLCPOC,LDLC,LDL,LDLCEXT,TRIGL,TGLPOCT,INR,INREXT,INRPOC,PTP,PTINR,PTEXT,DIG)@

## 2018-07-12 NOTE — LETTER
7/12/2018 3:12 PM 
 
Mr. Josiane Higgins [address] [place] [id number] Dear Josiane Higgins: 
 
Please find your most recent results below. Results for orders placed or performed in visit on 07/06/18 PSA, DIAGNOSTIC (PROSTATE SPECIFIC AG) Result Value Ref Range Prostate Specific Ag 2.6 0.0 - 4.0 ng/mL LIPID PANEL Result Value Ref Range Cholesterol, total 158 100 - 199 mg/dL Triglyceride 73 0 - 149 mg/dL HDL Cholesterol 43 >39 mg/dL VLDL, calculated 15 5 - 40 mg/dL LDL, calculated 100 (H) 0 - 99 mg/dL CRP, HIGH SENSITIVITY Result Value Ref Range C-Reactive Protein, Cardiac 3.96 (H) 0.00 - 3.00 mg/L METABOLIC PANEL, COMPREHENSIVE Result Value Ref Range Glucose 93 65 - 99 mg/dL BUN 15 8 - 27 mg/dL Creatinine 0.98 0.76 - 1.27 mg/dL GFR est non-AA 76 >59 mL/min/1.73 GFR est AA 88 >59 mL/min/1.73  
 BUN/Creatinine ratio 15 10 - 24 Sodium 140 134 - 144 mmol/L Potassium 4.0 3.5 - 5.2 mmol/L Chloride 99 96 - 106 mmol/L  
 CO2 24 20 - 29 mmol/L Calcium 9.5 8.6 - 10.2 mg/dL Protein, total 7.0 6.0 - 8.5 g/dL Albumin 4.3 3.5 - 4.8 g/dL GLOBULIN, TOTAL 2.7 1.5 - 4.5 g/dL A-G Ratio 1.6 1.2 - 2.2 Bilirubin, total 0.7 0.0 - 1.2 mg/dL Alk. phosphatase 78 39 - 117 IU/L  
 AST (SGOT) 14 0 - 40 IU/L  
 ALT (SGPT) 10 0 - 44 IU/L  
CBC WITH AUTOMATED DIFF Result Value Ref Range WBC 5.9 3.4 - 10.8 x10E3/uL  
 RBC 4.86 4.14 - 5.80 x10E6/uL HGB 14.5 13.0 - 17.7 g/dL HCT 46.0 37.5 - 51.0 % MCV 95 79 - 97 fL  
 MCH 29.8 26.6 - 33.0 pg  
 MCHC 31.5 31.5 - 35.7 g/dL  
 RDW 14.0 12.3 - 15.4 % PLATELET 383 930 - 460 x10E3/uL NEUTROPHILS 60 Not Estab. % Lymphocytes 25 Not Estab. % MONOCYTES 12 Not Estab. % EOSINOPHILS 2 Not Estab. % BASOPHILS 1 Not Estab. %  
 ABS. NEUTROPHILS 3.6 1.4 - 7.0 x10E3/uL Abs Lymphocytes 1.5 0.7 - 3.1 x10E3/uL  
 ABS. MONOCYTES 0.7 0.1 - 0.9 x10E3/uL ABS. EOSINOPHILS 0.1 0.0 - 0.4 x10E3/uL  
 ABS. BASOPHILS 0.0 0.0 - 0.2 x10E3/uL IMMATURE GRANULOCYTES 0 Not Estab. %  
 ABS. IMM. GRANS. 0.0 0.0 - 0.1 x10E3/uL Lab Results Component Value Date/Time  
 Prostate Specific Ag 2.6 07/11/2018 08:32 AM  
 Prostate Specific Ag 2.4 07/11/2017 09:37 AM  
 Prostate Specific Ag 1.4 03/21/2016 10:27 AM  
 
 
 
RECOMMENDATIONS: 
 
Your labs were excellent. The only concern that we talked about was the slightly jump in your PSA. While the levels are still normal, the jump parameters suggest that probably seeing a urologist just to be on the safe side due to family history, would be best.  
 
Everything else looks great. No need for repeat labs for 6-12 months. Please call me if you have any questions: [phone] Sincerely, Little Fajardo MD

## 2018-07-16 ENCOUNTER — OFFICE VISIT (OUTPATIENT)
Dept: DERMATOLOGY | Facility: AMBULATORY SURGERY CENTER | Age: 73
End: 2018-07-16

## 2018-07-16 VITALS
HEART RATE: 67 BPM | SYSTOLIC BLOOD PRESSURE: 158 MMHG | BODY MASS INDEX: 29.63 KG/M2 | TEMPERATURE: 98 F | DIASTOLIC BLOOD PRESSURE: 96 MMHG | RESPIRATION RATE: 16 BRPM | OXYGEN SATURATION: 97 % | HEIGHT: 73 IN | WEIGHT: 223.6 LBS

## 2018-07-16 DIAGNOSIS — D22.9 MULTIPLE BENIGN NEVI: ICD-10-CM

## 2018-07-16 DIAGNOSIS — Z85.828 FOLLOW-UP SURVEILLANCE OF SKIN CANCER, ENCOUNTER FOR: ICD-10-CM

## 2018-07-16 DIAGNOSIS — L90.5 SCAR: ICD-10-CM

## 2018-07-16 DIAGNOSIS — Z08 FOLLOW-UP SURVEILLANCE OF SKIN CANCER, ENCOUNTER FOR: ICD-10-CM

## 2018-07-16 DIAGNOSIS — Z85.820 PERSONAL HISTORY OF MALIGNANT MELANOMA OF SKIN: ICD-10-CM

## 2018-07-16 DIAGNOSIS — L82.1 SEBORRHEIC KERATOSES: Primary | ICD-10-CM

## 2018-07-16 DIAGNOSIS — D18.01 CHERRY ANGIOMA: ICD-10-CM

## 2018-07-16 NOTE — MR AVS SNAPSHOT
[facility] [address] 
[phone] Patient: Wesley Aguilar MRN: [id number] XUN:1[birth year] Visit Information Date & Time Provider Department Dept. Phone Encounter #  
 7/16/2018  9:30 AM DES Obrien  Follow-up Instructions Return in about 4 months (around 11/16/2018). Routing History Follow-up and Disposition History Your Appointments 8/13/2018 10:00 AM  
ROUTINE CARE with Rhett Storm MD  
[address] [place] [address]) Appt Note: 1 month follow up [address] [facility] [address]  
[phone]  
  
   
 [place] [address] [address] [place] [address]  
  
    
 11/19/2018  9:30 AM  
Office Visit with DES Obrien [address] [place] [address]) Appt Note: est. pt. 4 month skin exam  
 [facility] [address] [facility] [address] Upcoming Health Maintenance Date Due FOBT Q 1 YEAR AGE 50-75 2/16/1995 GLAUCOMA SCREENING Q2Y 2/16/2010 Pneumococcal 65+ High/Highest Risk (1 of 2 - PCV13) 2/16/2010 Influenza Age 5 to Adult 8/1/2018 MEDICARE YEARLY EXAM 7/13/2019 DTaP/Tdap/Td series (2 - Td) 7/11/2027 Allergies as of 7/16/2018  Review Complete On: 7/16/2018 By: Gunnar Avery NP Severity Noted Reaction Type Reactions Codeine  03/21/2016    Other (comments) Bad dreams Current Immunizations  Reviewed on 7/12/2018 No immunizations on file. Not reviewed this visit You Were Diagnosed With   
  
 Codes Comments Seborrheic keratoses    -  Primary ICD-10-CM: L82.1 ICD-9-CM: 702.19 Cherry angioma     ICD-10-CM: D18.01 
ICD-9-CM: 228.01   
 Multiple benign nevi     ICD-10-CM: D22.9 ICD-9-CM: 216.9 Personal history of malignant melanoma of skin     ICD-10-CM: Z85.820 ICD-9-CM: V10.82 Follow-up surveillance of skin cancer, encounter for     ICD-10-CM: Z08, Z85.828 ICD-9-CM: V67.9, V10.83 Scar     ICD-10-CM: L90.5 ICD-9-CM: 709.2 Vitals BP Pulse Temp Resp Height(growth percentile) Weight(growth percentile) (!) 158/96 (BP 1 Location: Left arm, BP Patient Position: Sitting) 67 98 °F (36.7 °C) (Oral) 16 6' 1\" (1.854 m) 223 lb 9.6 oz (101.4 kg) SpO2 BMI Smoking Status 97% 29.5 kg/m2 Never Smoker Vitals History BMI and BSA Data Body Mass Index Body Surface Area  
 29.5 kg/m 2 2.29 m 2 Preferred Pharmacy Pharmacy Name Phone [id number] [phone] , [address] [place] [address]  [phone] Your Updated Medication List  
  
   
This list is accurate as of 7/16/18 11:12 AM.  Always use your most recent med list.  
  
  
  
  
 aspirin delayed-release 81 mg tablet Take 81 mg by mouth daily. BENICAR 40 mg tablet Generic drug:  olmesartan Take 40 mg by mouth once over twenty-four (24) hours. carvedilol 6.25 mg tablet Commonly known as:  Jinx Re Take 6.25 mg by mouth two (2) times a day. pneumococcal 23-valent 25 mcg/0.5 mL injection Commonly known as:  PNEUMOVAX 23  
0.5 mL by IntraMUSCular route PRIOR TO DISCHARGE for 1 dose. PREVNAR 13 (PF) 0.5 mL Syrg injection Generic drug:  pneumococcal 13 zarina conj dip TO BE ADMINISTERED BY PHARMACIST FOR IMMUNIZATION Follow-up Instructions Return in about 4 months (around 11/16/2018). To-Do List   
 07/18/2018 8:15 AM  
(Arrive by 8:00 AM) Appointment with [facility] 1 at [facility] Ultrasound Department ([phone]) General  NPO DIET RESTICTIONS Please be NPO (nothing by mouth) for 6- 8 hours prior to procedure. GENERAL INSTRUCTIONS 1.  Bring any non [employer] [facility] facility films/reports pertaining to the area being studied with you on the day of appointment. 2. A written order with a valid diagnosis and Physicians signature is required for all scheduled tests. 3. Check in at registration 30 minutes before your appointment time unless you were instructed to do otherwise. Please arrive 15 minutes prior to appointment to register Introducing \[facility]\"" & [facility] SERVICES! Shelia Shannon introduces [employer] patient portal. Now you can access parts of your medical record, email your doctor's office, and request medication refills online. 1. In your internet browser, go to [email or web address] 2. Click on the First Time User? Click Here link in the Sign In box. You will see the New Member Sign Up page. 3. Enter your [employer] Access Code exactly as it appears below. You will not need to use this code after youve completed the sign-up process. If you do not sign up before the expiration date, you must request a new code. · [employer] Access Code: [email or web address] Expires: 10/14/2018  8:48 AM 
 
4. Enter the last four digits of your Social Security Number (xxxx) and Date of Birth (mm/dd/yyyy) as indicated and click Submit. You will be taken to the next sign-up page. 5. Create a [employer] ID. This will be your [employer] login ID and cannot be changed, so think of one that is secure and easy to remember. 6. Create a [employer] password. You can change your password at any time. 7. Enter your Password Reset Question and Answer. This can be used at a later time if you forget your password. 8. Enter your e-mail address. You will receive e-mail notification when new information is available in [address] 9. Click Sign Up. You can now view and download portions of your medical record. 10. Click the Download Summary menu link to download a portable copy of your medical information.  
 
If you have questions, please visit the Frequently Asked Questions section of the [employer]. Remember, [employer] is NOT to be used for urgent needs. For medical emergencies, dial 911. Now available from your iPhone and Android! Please provide this summary of care documentation to your next provider. Your primary care clinician is listed as [address]. If you have any questions after today's visit, please call [phone].

## 2018-07-16 NOTE — PROGRESS NOTES
Dallas Dickson is a 68 y.o. male    Chief Complaint   Patient presents with    Skin Problem     1. Have you been to the ER, urgent care clinic since your last visit? Hospitalized since your last visit? No    2. Have you seen or consulted any other health care providers outside of the [facility] since your last visit? Include any pap smears or colon screening.  No     Visit Vitals    BP (!) 158/96 (BP 1 Location: Left arm, BP Patient Position: Sitting)    Pulse 67    Temp 98 °F (36.7 °C) (Oral)    Resp 16    Ht 6' 1\" (1.854 m)    Wt 101.4 kg (223 lb 9.6 oz)    SpO2 97%    BMI 29.5 kg/m2     Ann Lerma LPN

## 2018-07-16 NOTE — PROGRESS NOTES
Name: William Lee       Age: 68 y.o. Date: 2018    Chief Complaint:   Chief Complaint   Patient presents with    Skin Problem       Subjective:    HPI  Mr. iWlliam Lee is a 68 y.o. male who presents for a full skin exam.  The patient's last skin exam was 3 months ago and the patient does not have current complaints related to his skin. He reports good knowledge of sun protection. The patient's pertinent skin history includes : Stage 1a malignant melanoma of the left upper arm (Breslow depth 0.55mm) tx 2016 with wide excision    ROS: Constitutional: Negative. Dermatological : negative      Social History     Social History    Marital status:      Spouse name: N/A    Number of children: N/A    Years of education: N/A     Occupational History    Not on file.      Social History Main Topics    Smoking status: Never Smoker    Smokeless tobacco: Never Used    Alcohol use No    Drug use: No    Sexual activity: No     Other Topics Concern    Not on file     Social History Narrative       Family History   Problem Relation Age of Onset    Other Mother      hypotension    Heart Failure Father     Cancer Father      Prostate    Other Father      PUD    Heart Disease Father      Heart Valve issue and replacement    Cancer Brother      Melanoma -  at 39yrs old   Samuel Dixon Cancer Sister      Hodgkin's in one, melanoma in other    Cancer Paternal Uncle      Prostate    Cancer Maternal Grandfather      Lung       Past Medical History:   Diagnosis Date    Family history of skin cancer     brother-melanoma    Hyperlipidemia     Hypertension     Sun-damaged skin     Thyroid disorder        Past Surgical History:   Procedure Laterality Date    HX TONSILLECTOMY      at 10years old       Current Outpatient Prescriptions   Medication Sig Dispense Refill    PREVNAR 13, PF, 0.5 mL syrg injection TO BE ADMINISTERED BY PHARMACIST FOR IMMUNIZATION  0    pneumococcal 23-valent (PNEUMOVAX 23) 25 mcg/0.5 mL injection 0.5 mL by IntraMUSCular route PRIOR TO DISCHARGE for 1 dose. 0.5 mL 0    BENICAR 40 mg tablet Take 40 mg by mouth once over twenty-four (24) hours.  carvedilol (COREG) 6.25 mg tablet Take 6.25 mg by mouth two (2) times a day. 4    aspirin delayed-release 81 mg tablet Take 81 mg by mouth daily. Allergies   Allergen Reactions    Codeine Other (comments)     Bad dreams         Objective:    Visit Vitals    BP (!) 158/96 (BP 1 Location: Left arm, BP Patient Position: Sitting)    Pulse 67    Temp 98 °F (36.7 °C) (Oral)    Resp 16    Ht 6' 1\" (1.854 m)    Wt 101.4 kg (223 lb 9.6 oz)    SpO2 97%    BMI 29.5 kg/m2       Pernell Watts is a 68 y.o. male who appears well and in no distress. He is awake, alert, and oriented. There is no preauricular, submandibular, or cervical lymphadenopathy. A skin examination was performed including his scalp, face (including eyelids), ears, neck, chest, back, abdomen, upper extremities (including digits/nails), lower extremities, breasts, buttocks; genital skin was not examined. He has tanned skin on sun exposed areas. There is an abrasion on the left forearm. There are scattered pink and brown intradermal and junctional nevi without concerning features for severe atypia . There are scattered cherry angiomas and non inflamed seborrheic keratoses. He has a well healed scar on the left upper arm without evidence of nodularity or pigment to suggest lesion recurrence. No associated left axillary lymphadenopathy. Assessment/Plan: 1. Seborrheic keratoses. The diagnosis was reviewed and the patient was reassured that no treatment is needed for these benign lesions. 2.Cherry angiomas. The diagnosis was reviewed and the patient was reassured that no treatment is needed for these benign lesions. 3. Normal nevi. The diagnosis of normal nevi was reviewed.   I discussed sun protection, sunscreen use, the warning signs of skin cancer, the need for self-skin examinations, and the need for regular practitioner exams every 4 months. The patient should follow up sooner as needed if new, changing, or symptomatic skin lesions arise. 4.Personal history of skin cancer - melanoma  I discussed sun protection, sunscreen use, the warning signs of skin cancer, the need for self-skin examinations, and the need for regular practitioner exams every 4 months. The patient should follow up sooner as needed if new, changing, or symptomatic skin lesions arise.

## 2018-07-18 ENCOUNTER — TELEPHONE (OUTPATIENT)
Dept: FAMILY MEDICINE CLINIC | Age: 73
End: 2018-07-18

## 2018-07-18 ENCOUNTER — HOSPITAL ENCOUNTER (OUTPATIENT)
Dept: ULTRASOUND IMAGING | Age: 73
Discharge: HOME | End: 2018-07-18
Attending: INTERNAL MEDICINE
Payer: MEDICARE

## 2018-07-18 DIAGNOSIS — Z13.6 SCREENING FOR AAA (ABDOMINAL AORTIC ANEURYSM): ICD-10-CM

## 2018-07-18 PROCEDURE — 76706 US ABDL AORTA SCREEN AAA: CPT

## 2018-07-18 NOTE — TELEPHONE ENCOUNTER
Spoke with patient and made him aware of negative results for aneurysm. Patient voiced appreciation for the call.

## 2018-07-18 NOTE — TELEPHONE ENCOUNTER
----- Message from Kayleen Crocker MD sent at 7/18/2018  9:16 AM EDT -----  Normal AAA US. No evidence of aneurysm at this time.

## 2018-08-13 ENCOUNTER — OFFICE VISIT (OUTPATIENT)
Dept: FAMILY MEDICINE CLINIC | Age: 73
End: 2018-08-13

## 2018-08-13 VITALS
SYSTOLIC BLOOD PRESSURE: 166 MMHG | HEART RATE: 64 BPM | DIASTOLIC BLOOD PRESSURE: 99 MMHG | HEIGHT: 73 IN | BODY MASS INDEX: 29.24 KG/M2 | WEIGHT: 220.6 LBS | RESPIRATION RATE: 20 BRPM | OXYGEN SATURATION: 97 % | TEMPERATURE: 97.7 F

## 2018-08-13 DIAGNOSIS — I10 ESSENTIAL HYPERTENSION, BENIGN: Primary | ICD-10-CM

## 2018-08-13 RX ORDER — TERAZOSIN 2 MG/1
2 CAPSULE ORAL
Qty: 30 CAP | Refills: 3 | Status: SHIPPED | OUTPATIENT
Start: 2018-08-13 | End: 2018-10-01 | Stop reason: SDUPTHER

## 2018-08-13 NOTE — PATIENT INSTRUCTIONS

## 2018-08-13 NOTE — MR AVS SNAPSHOT
[facility] 
 
 
 [address] [facility] 
[phone] Patient: Margoth Fountain MRN: [id number] TJU:2/90/[birth year]04 Visit Information Date & Time Provider Department Dept. Phone Encounter #  
 8/13/2018 10:00 AM Jacob Tucker [phone] [id number] Your Appointments 11/19/2018  9:30 AM  
Office Visit with DES Manriquez [address] [place] [address]) Appt Note: est. pt. 4 month skin exam  
 [facility] [address] [facility] [id number] [place] [address] Upcoming Health Maintenance Date Due FOBT Q 1 YEAR AGE 50-75 2/16/1995 GLAUCOMA SCREENING Q2Y 2/16/2010 Pneumococcal 65+ High/Highest Risk (1 of 2 - PCV13) 2/16/2010 Influenza Age 5 to Adult 8/1/2018 MEDICARE YEARLY EXAM 7/13/2019 DTaP/Tdap/Td series (2 - Td) 7/11/2027 Allergies as of 8/13/2018  Review Complete On: 8/13/2018 By: Little Fajardo MD  
  
 Severity Noted Reaction Type Reactions Codeine  03/21/2016    Other (comments) Bad dreams Current Immunizations  Reviewed on 8/13/2018 No immunizations on file. Reviewed by Little Fajardo MD on 8/13/2018 at 10:43 AM  
You Were Diagnosed With   
  
 Codes Comments Essential hypertension, benign    -  Primary ICD-10-CM: I10 
ICD-9-CM: 401.1 Vitals BP Pulse Temp Resp Height(growth percentile) Weight(growth percentile) (!) 166/99 (BP 1 Location: Left arm, BP Patient Position: Sitting) 64 97.7 °F (36.5 °C) (Oral) 20 6' 1\" (1.854 m) 220 lb 9.6 oz (100.1 kg) SpO2 BMI Smoking Status 97% 29.1 kg/m2 Never Smoker BMI and BSA Data Body Mass Index Body Surface Area  
 29.1 kg/m 2 2.27 m 2 Preferred Pharmacy Pharmacy Name Phone [id number] [phone] , [address] [place] [address]  [phone] Your Updated Medication List  
  
   
This list is accurate as of 8/13/18 10:51 AM.  Always use your most recent med list.  
  
  
  
  
 aspirin delayed-release 81 mg tablet Take 81 mg by mouth daily. BENICAR 40 mg tablet Generic drug:  olmesartan Take 40 mg by mouth once over twenty-four (24) hours. carvedilol 6.25 mg tablet Commonly known as:  Belkis Karolyn Take 6.25 mg by mouth two (2) times a day. PREVNAR 13 (PF) 0.5 mL Syrg injection Generic drug:  pneumococcal 13 zarina conj dip TO BE ADMINISTERED BY PHARMACIST FOR IMMUNIZATION  
  
 terazosin 2 mg capsule Commonly known as:  HYTRIN Take 1 Cap by mouth nightly for 30 days. Prescriptions Sent to Pharmacy Refills  
 terazosin (HYTRIN) 2 mg capsule 3 Sig: Take 1 Cap by mouth nightly for 30 days. Class: Normal  
 [id number] [address] [facility], [address] [place] [address]  Ph #: [phone] Route: Oral  
  
Patient Instructions DASH Diet: Care Instructions Your Care Instructions The DASH diet is an eating plan that can help lower your blood pressure. DASH stands for Dietary Approaches to Stop Hypertension. Hypertension is high blood pressure. The DASH diet focuses on eating foods that are high in calcium, potassium, and magnesium. These nutrients can lower blood pressure. The foods that are highest in these nutrients are fruits, vegetables, low-fat dairy products, nuts, seeds, and legumes. But taking calcium, potassium, and magnesium supplements instead of eating foods that are high in those nutrients does not have the same effect. The DASH diet also includes whole grains, fish, and poultry. The DASH diet is one of several lifestyle changes your doctor may recommend to lower your high blood pressure. Your doctor may also want you to decrease the amount of sodium in your diet.  Lowering sodium while following the DASH diet can lower blood pressure even further than just the DASH diet alone. Follow-up care is a key part of your treatment and safety. Be sure to make and go to all appointments, and call your doctor if you are having problems. It's also a good idea to know your test results and keep a list of the medicines you take. How can you care for yourself at home? Following the DASH diet · Eat 4 to 5 servings of fruit each day. A serving is 1 medium-sized piece of fruit, ½ cup chopped or canned fruit, 1/4 cup dried fruit, or 4 ounces (½ cup) of fruit juice. Choose fruit more often than fruit juice. · Eat 4 to 5 servings of vegetables each day. A serving is 1 cup of lettuce or raw leafy vegetables, ½ cup of chopped or cooked vegetables, or 4 ounces (½ cup) of vegetable juice. Choose vegetables more often than vegetable juice. · Get 2 to 3 servings of low-fat and fat-free dairy each day. A serving is 8 ounces of milk, 1 cup of yogurt, or 1 ½ ounces of cheese. · Eat 6 to 8 servings of grains each day. A serving is 1 slice of bread, 1 ounce of dry cereal, or ½ cup of cooked rice, pasta, or cooked cereal. Try to choose whole-grain products as much as possible. · Limit lean meat, poultry, and fish to 2 servings each day. A serving is 3 ounces, about the size of a deck of cards. · Eat 4 to 5 servings of nuts, seeds, and legumes (cooked dried beans, lentils, and split peas) each week. A serving is 1/3 cup of nuts, 2 tablespoons of seeds, or ½ cup of cooked beans or peas. · Limit fats and oils to 2 to 3 servings each day. A serving is 1 teaspoon of vegetable oil or 2 tablespoons of salad dressing. · Limit sweets and added sugars to 5 servings or less a week. A serving is 1 tablespoon jelly or jam, ½ cup sorbet, or 1 cup of lemonade. · Eat less than 2,300 milligrams (mg) of sodium a day. If you limit your sodium to 1,500 mg a day, you can lower your blood pressure even more. Tips for success · Start small.  Do not try to make dramatic changes to your diet all at once. You might feel that you are missing out on your favorite foods and then be more likely to not follow the plan. Make small changes, and stick with them. Once those changes become habit, add a few more changes. · Try some of the following: ¨ Make it a goal to eat a fruit or vegetable at every meal and at snacks. This will make it easy to get the recommended amount of fruits and vegetables each day. ¨ Try yogurt topped with fruit and nuts for a snack or healthy dessert. ¨ Add lettuce, tomato, cucumber, and onion to sandwiches. ¨ Combine a ready-made pizza crust with low-fat mozzarella cheese and lots of vegetable toppings. Try using tomatoes, squash, spinach, broccoli, carrots, cauliflower, and onions. ¨ Have a variety of cut-up vegetables with a low-fat dip as an appetizer instead of chips and dip. ¨ Sprinkle sunflower seeds or chopped almonds over salads. Or try adding chopped walnuts or almonds to cooked vegetables. ¨ Try some vegetarian meals using beans and peas. Add garbanzo or kidney beans to salads. Make burritos and tacos with mashed talamantes beans or black beans. Where can you learn more? Go to http://allie-valdemar.info/. Enter Y509 in the search box to learn more about \"DASH Diet: Care Instructions. \" Current as of: December 6, 2017 Content Version: 11.7 © 9938-5535 [employer]. Care instructions adapted under license by [employer] (which disclaims liability or warranty for this information). If you have questions about a medical condition or this instruction, always ask your healthcare professional. [place] [address] any warranty or liability for your use of this information. Introducing [facility] & HEALTH SERVICES! [facility] introduces [employer] patient portal. Now you can access parts of your medical record, email your doctor's office, and request medication refills online.    
 
1. In your internet browser, go to [email or web address] 2. Click on the First Time User? Click Here link in the Sign In box. You will see the New Member Sign Up page. 3. Enter your [employer] Access Code exactly as it appears below. You will not need to use this code after youve completed the sign-up process. If you do not sign up before the expiration date, you must request a new code. · [employer] Access Code: [email or web address] Expires: 10/14/2018  8:48 AM 
 
4. Enter the last four digits of your Social Security Number (xxxx) and Date of Birth (mm/dd/yyyy) as indicated and click Submit. You will be taken to the next sign-up page. 5. Create a [employer] ID. This will be your [employer] login ID and cannot be changed, so think of one that is secure and easy to remember. 6. Create a [employer] password. You can change your password at any time. 7. Enter your Password Reset Question and Answer. This can be used at a later time if you forget your password. 8. Enter your e-mail address. You will receive e-mail notification when new information is available in [address] 9. Click Sign Up. You can now view and download portions of your medical record. 10. Click the Download Summary menu link to download a portable copy of your medical information. If you have questions, please visit the Frequently Asked Questions section of the [employer] website. Remember, [employer] is NOT to be used for urgent needs. For medical emergencies, dial 911. Now available from your iPhone and Android! Please provide this summary of care documentation to your next provider. Your primary care clinician is listed as [address]. If you have any questions after today's visit, please call [phone].

## 2018-08-13 NOTE — PROGRESS NOTES
Identified pt with two pt identifiers(name and ). Chief Complaint   Patient presents with    Follow Up Chronic Condition     Follow up for high blood pressures        Health Maintenance Due   Topic    FOBT Q 1 YEAR AGE 50-75     GLAUCOMA SCREENING Q2Y     Pneumococcal 65+ High/Highest Risk (1 of 2 - PCV13)    Influenza Age 5 to Adult        Wt Readings from Last 3 Encounters:   18 223 lb 9.6 oz (101.4 kg)   18 218 lb 3.2 oz (99 kg)   18 213 lb (96.6 kg)     Temp Readings from Last 3 Encounters:   18 98 °F (36.7 °C) (Oral)   18 97.5 °F (36.4 °C) (Oral)   18 98.2 °F (36.8 °C) (Oral)     BP Readings from Last 3 Encounters:   18 (!) 158/96   18 140/80   18 146/90     Pulse Readings from Last 3 Encounters:   18 67   18 66   18 85         Learning Assessment:  :     Learning Assessment 2016 2016 3/21/2016   PRIMARY LEARNER Patient Patient Patient   HIGHEST LEVEL OF EDUCATION - PRIMARY LEARNER  - - 4 YEARS OF COLLEGE   BARRIERS PRIMARY LEARNER - NONE NONE   CO-LEARNER CAREGIVER - No No   PRIMARY LANGUAGE ENGLISH ENGLISH ENGLISH   LEARNER PREFERENCE PRIMARY DEMONSTRATION DEMONSTRATION DEMONSTRATION     - - READING     - - LISTENING   LEARNING SPECIAL TOPICS no no -   ANSWERED BY patient patient patient   RELATIONSHIP SELF SELF SELF   ASSESSMENT COMMENT none none -       Depression Screening:  :     PHQ over the last two weeks 2018   Little interest or pleasure in doing things Not at all   Feeling down, depressed, irritable, or hopeless Not at all   Total Score PHQ 2 0       Fall Risk Assessment:  :     Fall Risk Assessment, last 12 mths 2018   Able to walk? Yes   Fall in past 12 months? No   Fall with injury? -   Number of falls in past 12 months -   Fall Risk Score -       Abuse Screening:  :     Abuse Screening Questionnaire 2017   Do you ever feel afraid of your partner?  N   Are you in a relationship with someone who physically or mentally threatens you? N   Is it safe for you to go home? Y       Coordination of Care Questionnaire:  :     1) Have you been to an emergency room, urgent care clinic since your last visit? no   Hospitalized since your last visit? no             2) Have you seen or consulted any other health care providers outside of [address] [place] [address] since your last visit? yes  Urologist and dentist (Include any pap smears or colon screenings in this section.)    3) Do you have an Advance Directive on file? yes  Are you interested in receiving information about Advance Directives? no    Reviewed record in preparation for visit and have obtained necessary documentation. Medication reconciliation up to date and corrected with patient at this time.

## 2018-10-01 ENCOUNTER — OFFICE VISIT (OUTPATIENT)
Dept: FAMILY MEDICINE CLINIC | Age: 73
End: 2018-10-01

## 2018-10-01 VITALS
SYSTOLIC BLOOD PRESSURE: 140 MMHG | RESPIRATION RATE: 20 BRPM | DIASTOLIC BLOOD PRESSURE: 90 MMHG | TEMPERATURE: 97.6 F | BODY MASS INDEX: 28.86 KG/M2 | HEART RATE: 70 BPM | WEIGHT: 217.8 LBS | OXYGEN SATURATION: 96 % | HEIGHT: 73 IN

## 2018-10-01 DIAGNOSIS — Z23 ENCOUNTER FOR IMMUNIZATION: ICD-10-CM

## 2018-10-01 DIAGNOSIS — I10 ESSENTIAL HYPERTENSION: Primary | ICD-10-CM

## 2018-10-01 DIAGNOSIS — I10 ESSENTIAL HYPERTENSION, BENIGN: ICD-10-CM

## 2018-10-01 RX ORDER — TERAZOSIN 2 MG/1
2 CAPSULE ORAL
Qty: 90 CAP | Refills: 0 | Status: SHIPPED | OUTPATIENT
Start: 2018-10-01 | End: 2019-01-05 | Stop reason: SDUPTHER

## 2018-10-01 RX ORDER — CARVEDILOL 12.5 MG/1
12.5 TABLET ORAL 2 TIMES DAILY WITH MEALS
Qty: 180 TAB | Refills: 0 | Status: SHIPPED | OUTPATIENT
Start: 2018-10-01 | End: 2019-02-13 | Stop reason: SDUPTHER

## 2018-10-01 NOTE — PROGRESS NOTES
Chief Complaint: 
Chief Complaint Patient presents with  Follow Up Chronic Condition 1 month follow up for hypertension History of Present Illness: He is a 68 y.o. male who presents for follow up of hypertension and recently started on Hytrin for both and also for mild BPH. I note that he has been on Benicare and Carvedilol. He is not routinely checking his BP at home. He is here for a follow up to see if the medication has been helpful. The patient denies CP, SOB or any other constitutional symptoms. He has a new dog and has been walking her everyday. Reviewed PmHx, RxHx, FmHx, SocHx, AllgHx and updated and dated in the chart. Patient Active Problem List  
 Diagnosis  Paroxysmal atrial fibrillation (HCC)  Melanoma of left upper arm (HCC)  
  0.55 mm Felix II-III, regression, 2 mitoses, excised with 1 cm margins 8/23/16  Elevated TSH Review of Systems - negative except as listed above in the HPI Objective:  
 
Vitals:  
 10/01/18 1301 BP: 168/88 Resp: 20 Temp: 97.6 °F (36.4 °C) TempSrc: Oral  
SpO2: 96% Weight: 217 lb 12.8 oz (98.8 kg) Height: 6' 1\" (1.854 m) Physical Examination:  
General appearance - alert, well appearing, and in no distress Chest - clear to auscultation, no wheezes, rales or rhonchi, symmetric air entry Heart - normal rate, regular rhythm, normal S1, S2, no murmurs, rubs, clicks or gallops Neurological - alert, oriented, normal speech, no focal findings or movement disorder noted Musculoskeletal - no joint tenderness, deformity or swelling Extremities - peripheral pulses normal, no pedal edema, no clubbing or cyanosis Skin - normal coloration and turgor, no rashes, no suspicious skin lesions noted Assessment/ Plan:  
Diagnoses and all orders for this visit: ICD-10-CM ICD-9-CM 1. Essential hypertension I10 401.9 carvedilol (COREG) 12.5 mg tablet 2.  Encounter for immunization Z23 V03.89 ADMIN INFLUENZA VIRUS VAC  
 INFLUENZA VIRUS VACCINE, HIGH DOSE SEASONAL, PRESERVATIVE FREE 3. Essential hypertension, benign I10 401.1 terazosin (HYTRIN) 2 mg capsule 73M who presents today for follow up of hypertension. BP is still somewhat elevated, but repeat showed slightly decreased. Will increase the Carvedilol to  12.5 BID and continue with the hytrin. I have also given him a new copy of the DASH diet for use in adding his BP as well. In addition, will also give the flu shot today. I have discussed the diagnosis with the patient and the intended treatment plan as seen in the above orders. The patient has received an after-visit summary and questions were answered concerning future plans. Asked to return should symptoms worsen or not improve with treatment. Any pending labs and studies will be relayed to patient when they become available. Pt verbalizes understanding of plan of care and denies further questions or concerns at this time. Follow-up Disposition: 
Return in about 1 month (around 11/1/2018), or if symptoms worsen or fail to improve, for Follow up BP. Molly Zavala MD 
Patient Instructions DASH Diet: Care Instructions Your Care Instructions The DASH diet is an eating plan that can help lower your blood pressure. DASH stands for Dietary Approaches to Stop Hypertension. Hypertension is high blood pressure. The DASH diet focuses on eating foods that are high in calcium, potassium, and magnesium. These nutrients can lower blood pressure. The foods that are highest in these nutrients are fruits, vegetables, low-fat dairy products, nuts, seeds, and legumes. But taking calcium, potassium, and magnesium supplements instead of eating foods that are high in those nutrients does not have the same effect. The DASH diet also includes whole grains, fish, and poultry.  
The DASH diet is one of several lifestyle changes your doctor may recommend to lower your high blood pressure. Your doctor may also want you to decrease the amount of sodium in your diet. Lowering sodium while following the DASH diet can lower blood pressure even further than just the DASH diet alone. Follow-up care is a key part of your treatment and safety. Be sure to make and go to all appointments, and call your doctor if you are having problems. It's also a good idea to know your test results and keep a list of the medicines you take. How can you care for yourself at home? Following the DASH diet · Eat 4 to 5 servings of fruit each day. A serving is 1 medium-sized piece of fruit, ½ cup chopped or canned fruit, 1/4 cup dried fruit, or 4 ounces (½ cup) of fruit juice. Choose fruit more often than fruit juice. · Eat 4 to 5 servings of vegetables each day. A serving is 1 cup of lettuce or raw leafy vegetables, ½ cup of chopped or cooked vegetables, or 4 ounces (½ cup) of vegetable juice. Choose vegetables more often than vegetable juice. · Get 2 to 3 servings of low-fat and fat-free dairy each day. A serving is 8 ounces of milk, 1 cup of yogurt, or 1 ½ ounces of cheese. · Eat 6 to 8 servings of grains each day. A serving is 1 slice of bread, 1 ounce of dry cereal, or ½ cup of cooked rice, pasta, or cooked cereal. Try to choose whole-grain products as much as possible. · Limit lean meat, poultry, and fish to 2 servings each day. A serving is 3 ounces, about the size of a deck of cards. · Eat 4 to 5 servings of nuts, seeds, and legumes (cooked dried beans, lentils, and split peas) each week. A serving is 1/3 cup of nuts, 2 tablespoons of seeds, or ½ cup of cooked beans or peas. · Limit fats and oils to 2 to 3 servings each day. A serving is 1 teaspoon of vegetable oil or 2 tablespoons of salad dressing. · Limit sweets and added sugars to 5 servings or less a week. A serving is 1 tablespoon jelly or jam, ½ cup sorbet, or 1 cup of lemonade. · Eat less than 2,300 milligrams (mg) of sodium a day. If you limit your sodium to 1,500 mg a day, you can lower your blood pressure even more. Tips for success · Start small. Do not try to make dramatic changes to your diet all at once. You might feel that you are missing out on your favorite foods and then be more likely to not follow the plan. Make small changes, and stick with them. Once those changes become habit, add a few more changes. · Try some of the following: ¨ Make it a goal to eat a fruit or vegetable at every meal and at snacks. This will make it easy to get the recommended amount of fruits and vegetables each day. ¨ Try yogurt topped with fruit and nuts for a snack or healthy dessert. ¨ Add lettuce, tomato, cucumber, and onion to sandwiches. ¨ Combine a ready-made pizza crust with low-fat mozzarella cheese and lots of vegetable toppings. Try using tomatoes, squash, spinach, broccoli, carrots, cauliflower, and onions. ¨ Have a variety of cut-up vegetables with a low-fat dip as an appetizer instead of chips and dip. ¨ Sprinkle sunflower seeds or chopped almonds over salads. Or try adding chopped walnuts or almonds to cooked vegetables. ¨ Try some vegetarian meals using beans and peas. Add garbanzo or kidney beans to salads. Make burritos and tacos with mashed talamantes beans or black beans. Where can you learn more? Go to http://allie-valdemar.info/. Enter V827 in the search box to learn more about \"DASH Diet: Care Instructions. \" Current as of: December 6, 2017 Content Version: 11.7 © 2652-3132 [employer]. Care instructions adapted under license by [employer] (which disclaims liability or warranty for this information). If you have questions about a medical condition or this instruction, always ask your healthcare professional. [address] any warranty or liability for your use of this information.

## 2018-10-01 NOTE — MR AVS SNAPSHOT
[facility] 
 
 
 [address] [facility] 
[phone] Patient: Neda Warner MRN: [id number] CVQ:[id number] Visit Information Date & Time Provider Department Dept. Phone Encounter #  
 10/1/2018  9:30 AM Jacob Hager [phone] [id number] Follow-up Instructions Return in about 1 month (around 11/1/2018), or if symptoms worsen or fail to improve, for Follow up BP. Your Appointments 11/19/2018  9:30 AM  
[facility] with DES Wong [facility] Appt Note: est. pt. 4 month skin exam  
 [facility] [id number] [place] [id number] Upcoming Health Maintenance Date Due Shingrix Vaccine Age 50> (1 of 2) 2/16/1995 FOBT Q 1 YEAR AGE 50-75 2/16/1995 GLAUCOMA SCREENING Q2Y 2/16/2010 Pneumococcal 65+ High/Highest Risk (2 of 2 - PPSV23) 6/11/2018 Influenza Age 5 to Adult 8/1/2018 MEDICARE YEARLY EXAM 7/13/2019 DTaP/Tdap/Td series (2 - Td) 7/11/2027 Allergies as of 10/1/2018  Review Complete On: 10/1/2018 By: Meggan Park MD  
  
 Severity Noted Reaction Type Reactions Codeine  03/21/2016    Other (comments) Bad dreams Current Immunizations  Reviewed on 8/13/2018 Name Date Influenza High Dose Vaccine PF  Incomplete Pneumococcal Conjugate (PCV-13) 4/16/2018 Not reviewed this visit You Were Diagnosed With   
  
 Codes Comments Essential hypertension    -  Primary ICD-10-CM: I10 
ICD-9-CM: 401.9 Encounter for immunization     ICD-10-CM: L64 ICD-9-CM: V03.89 Essential hypertension, benign     ICD-10-CM: I10 
ICD-9-CM: 401.1 Vitals BP Pulse Temp Resp Height(growth percentile) Weight(growth percentile) 140/90 70 97.6 °F (36.4 °C) (Oral) 20 6' 1\" (1.854 m) 217 lb 12.8 oz (98.8 kg) SpO2 BMI Smoking Status 96% 28.74 kg/m2 Never Smoker Vitals History BMI and BSA Data Body Mass Index Body Surface Area 28.74 kg/m 2 2.26 m 2 [id number] [address]  [phone] Your Updated Medication List  
  
   
This list is accurate as of 10/1/18 10:03 AM.  Always use your most recent med list.  
  
  
  
  
 aspirin delayed-release 81 mg tablet Take 81 mg by mouth daily. BENICAR 40 mg tablet Generic drug:  olmesartan Take 40 mg by mouth once over twenty-four (24) hours. carvedilol 12.5 mg tablet Commonly known as:  Paola Endow Take 1 Tab by mouth two (2) times daily (with meals) for 90 days. PREVNAR 13 (PF) 0.5 mL Syrg injection Generic drug:  pneumococcal 13 zarina conj dip TO BE ADMINISTERED BY PHARMACIST FOR IMMUNIZATION  
  
 terazosin 2 mg capsule Commonly known as:  HYTRIN Take 1 Cap by mouth nightly for 90 days. Prescriptions Sent to Pharmacy Refills  
 carvedilol (COREG) 12.5 mg tablet 0 Sig: Take 1 Tab by mouth two (2) times daily (with meals) for 90 days. Class: Normal  
 Pharmacy: [facility]/pharmacy #[id number] [address] Ph #: [phone] Route: Oral  
 terazosin (HYTRIN) 2 mg capsule 0 Sig: Take 1 Cap by mouth nightly for 90 days. Class: Normal  
 Pharmacy: [facility]/pharmacy #[id number] [address] Ph #: [phone] Route: Oral  
  
We Performed the Following ADMIN INFLUENZA VIRUS VAC [ [facility]] INFLUENZA VIRUS VACCINE, HIGH DOSE SEASONAL, PRESERVATIVE FREE [47365 CPT(R)] Follow-up Instructions Return in about 1 month (around 11/1/2018), or if symptoms worsen or fail to improve, for Follow up BP. Patient Instructions DASH Diet: Care Instructions Your Care Instructions The DASH diet is an eating plan that can help lower your blood pressure. DASH stands for Dietary Approaches to Stop Hypertension. Hypertension is high blood pressure. The DASH diet focuses on eating foods that are high in calcium, potassium, and magnesium. These nutrients can lower blood pressure. The foods that are highest in these nutrients are fruits, vegetables, low-fat dairy products, nuts, seeds, and legumes. But taking calcium, potassium, and magnesium supplements instead of eating foods that are high in those nutrients does not have the same effect. The DASH diet also includes whole grains, fish, and poultry. The DASH diet is one of several lifestyle changes your doctor may recommend to lower your high blood pressure. Your doctor may also want you to decrease the amount of sodium in your diet. Lowering sodium while following the DASH diet can lower blood pressure even further than just the DASH diet alone. Follow-up care is a key part of your treatment and safety. Be sure to make and go to all appointments, and call your doctor if you are having problems. It's also a good idea to know your test results and keep a list of the medicines you take. How can you care for yourself at home? Following the DASH diet · Eat 4 to 5 servings of fruit each day. A serving is 1 medium-sized piece of fruit, ½ cup chopped or canned fruit, 1/4 cup dried fruit, or 4 ounces (½ cup) of fruit juice. Choose fruit more often than fruit juice. · Eat 4 to 5 servings of vegetables each day. A serving is 1 cup of lettuce or raw leafy vegetables, ½ cup of chopped or cooked vegetables, or 4 ounces (½ cup) of vegetable juice. Choose vegetables more often than vegetable juice. · Get 2 to 3 servings of low-fat and fat-free dairy each day. A serving is 8 ounces of milk, 1 cup of yogurt, or 1 ½ ounces of cheese. · Eat 6 to 8 servings of grains each day.  A serving is 1 slice of bread, 1 ounce of dry cereal, or ½ cup of cooked rice, pasta, or cooked cereal. Try to choose whole-grain products as much as possible. · Limit lean meat, poultry, and fish to 2 servings each day. A serving is 3 ounces, about the size of a deck of cards. · Eat 4 to 5 servings of nuts, seeds, and legumes (cooked dried beans, lentils, and split peas) each week. A serving is 1/3 cup of nuts, 2 tablespoons of seeds, or ½ cup of cooked beans or peas. · Limit fats and oils to 2 to 3 servings each day. A serving is 1 teaspoon of vegetable oil or 2 tablespoons of salad dressing. · Limit sweets and added sugars to 5 servings or less a week. A serving is 1 tablespoon jelly or jam, ½ cup sorbet, or 1 cup of lemonade. · Eat less than 2,300 milligrams (mg) of sodium a day. If you limit your sodium to 1,500 mg a day, you can lower your blood pressure even more. Tips for success · Start small. Do not try to make dramatic changes to your diet all at once. You might feel that you are missing out on your favorite foods and then be more likely to not follow the plan. Make small changes, and stick with them. Once those changes become habit, add a few more changes. · Try some of the following: ¨ Make it a goal to eat a fruit or vegetable at every meal and at snacks. This will make it easy to get the recommended amount of fruits and vegetables each day. ¨ Try yogurt topped with fruit and nuts for a snack or healthy dessert. ¨ Add lettuce, tomato, cucumber, and onion to sandwiches. ¨ Combine a ready-made pizza crust with low-fat mozzarella cheese and lots of vegetable toppings. Try using tomatoes, squash, spinach, broccoli, carrots, cauliflower, and onions. ¨ Have a variety of cut-up vegetables with a low-fat dip as an appetizer instead of chips and dip. ¨ Sprinkle sunflower seeds or chopped almonds over salads. Or try adding chopped walnuts or almonds to cooked vegetables. ¨ Try some vegetarian meals using beans and peas. Add garbanzo or kidney beans to salads. Make burritos and tacos with mashed talamantes beans or black beans. Where can you learn more? Go to http://allie-valdemar.info/. Enter B494 in the search box to learn more about \"DASH Diet: Care Instructions. \" Current as of: December 6, 2017 Content Version: 11.7 © 9682-2243 [employer]. Care instructions adapted under license by [employer] (which disclaims liability or warranty for this information). If you have questions about a medical condition or this instruction, always ask your healthcare professional. [address] any warranty or liability for your use of this information. Introducing [facility]! Jyoti Veronica introduces TrueFacet patient portal. Now you can access parts of your medical record, email your doctor's office, and request medication refills online. 1. In your internet browser, go to [email or web address] 2. Click on the First Time User? Click Here link in the Sign In box. You will see the New Member Sign Up page. 3. Enter your TrueFacet Access Code exactly as it appears below. You will not need to use this code after youve completed the sign-up process. If you do not sign up before the expiration date, you must request a new code. · TrueFacet Access Code: [email or web address] Expires: 10/14/2018  8:48 AM 
 
4. Enter the last four digits of your Social Security Number (xxxx) and Date of Birth (mm/dd/yyyy) as indicated and click Submit. You will be taken to the next sign-up page. 5. Create a TrueFacet ID. This will be your TrueFacet login ID and cannot be changed, so think of one that is secure and easy to remember. 6. Create a TrueFacet password. You can change your password at any time. 7. Enter your Password Reset Question and Answer. This can be used at a later time if you forget your password. 8. Enter your e-mail address. You will receive e-mail notification when new information is available in [address] 9. Click Sign Up. You can now view and download portions of your medical record. 10. Click the Download Summary menu link to download a portable copy of your medical information. If you have questions, please visit the Frequently Asked Questions section of the [employer] website. Remember, [employer] is NOT to be used for urgent needs. For medical emergencies, dial 911. Now available from your iPhone and Android! Please provide this summary of care documentation to your next provider. Your primary care clinician is listed as [address]. If you have any questions after today's visit, please call [phone].

## 2018-10-01 NOTE — PROGRESS NOTES
Chief Complaint Patient presents with  Follow Up Chronic Condition 1 month follow up for hypertension Reviewed PmHx, RxHx, FmHx, SocHx, AllgHx and updated and dated in the chart. Patient Active Problem List  
 Diagnosis  Paroxysmal atrial fibrillation (HCC)  Melanoma of left upper arm (HCC)  
  0.55 mm Felix II-III, regression, 2 mitoses, excised with 1 cm margins 8/23/16  Elevated TSH Review of Systems - negative except as listed above in the HPI Objective:  
 
Vitals:  
 10/01/18 6196 BP: 168/88 Resp: 20 Temp: 97.6 °F (36.4 °C) TempSrc: Oral  
SpO2: 96% Weight: 217 lb 12.8 oz (98.8 kg) Height: 6' 1\" (1.854 m) Physical Examination:  
{male adult master:[id number]} Assessment/ Plan:  
{There are no diagnoses linked to this encounter. (Refresh or delete this SmartLink)} Follow-up Disposition: Not on File I have discussed the diagnosis with the patient and the intended treatment plan as seen in the above orders. The patient has received an after-visit summary and questions were answered concerning future plans. Asked to return should symptoms worsen or not improve with treatment. Any pending labs and studies will be relayed to patient when they become available. Pt verbalizes understanding of plan of care and denies further questions or concerns at this time. Arina Springer MD 
There are no Patient Instructions on file for this visit.

## 2018-10-01 NOTE — PROGRESS NOTES
Identified pt with two pt identifiers(name and ). Chief Complaint Patient presents with  Follow Up Chronic Condition 1 month follow up for hypertension Health Maintenance Due Topic  Shingrix Vaccine Age 50> (1 of 2)  FOBT Q 1 YEAR AGE 50-75  GLAUCOMA SCREENING Q2Y  Pneumococcal 65+ High/Highest Risk (2 of 2 - PPSV23)  Influenza Age 5 to Adult Wt Readings from Last 3 Encounters:  
18 220 lb 9.6 oz (100.1 kg) 18 223 lb 9.6 oz (101.4 kg) 18 218 lb 3.2 oz (99 kg) Temp Readings from Last 3 Encounters:  
18 97.7 °F (36.5 °C) (Oral) 18 98 °F (36.7 °C) (Oral) 18 97.5 °F (36.4 °C) (Oral) BP Readings from Last 3 Encounters:  
18 (!) 166/99  
18 (!) 158/96  
18 140/80 Pulse Readings from Last 3 Encounters:  
18 64  
18 67  
18 66 Learning Assessment: 
:  
 
Learning Assessment 2016 2016 3/21/2016 PRIMARY LEARNER Patient Patient Patient HIGHEST LEVEL OF EDUCATION - PRIMARY LEARNER  - - 4 YEARS OF COLLEGE  
BARRIERS PRIMARY LEARNER - NONE NONE  
CO-LEARNER CAREGIVER - No No  
PRIMARY LANGUAGE ENGLISH ENGLISH ENGLISH  
LEARNER PREFERENCE PRIMARY DEMONSTRATION DEMONSTRATION DEMONSTRATION  
  - - READING  
  - - LISTENING  
LEARNING SPECIAL TOPICS no no -  
ANSWERED BY patient patient patient RELATIONSHIP SELF SELF SELF  
ASSESSMENT COMMENT none none -  
 
 
Depression Screening: 
:  
 
PHQ over the last two weeks 10/1/2018 Little interest or pleasure in doing things Not at all Feeling down, depressed, irritable, or hopeless Not at all Total Score PHQ 2 0 Fall Risk Assessment: 
:  
 
Fall Risk Assessment, last 12 mths 10/1/2018 Able to walk? Yes Fall in past 12 months? No  
Fall with injury? -  
Number of falls in past 12 months - Fall Risk Score -  
 
 
Abuse Screening: 
:  
 
Abuse Screening Questionnaire 2017 Do you ever feel afraid of your partner? Jose L Karimi Are you in a relationship with someone who physically or mentally threatens you? Jose L Karimi Is it safe for you to go home? Sylvester Canela Coordination of Care Questionnaire: 
:  
 
1) Have you been to an emergency room, urgent care clinic since your last visit? no  
Hospitalized since your last visit? no          
 
2) Have you seen or consulted any other health care providers outside of [facility] since your last visit? no  (Include any pap smears or colon screenings in this section.) 3) Do you have an Advance Directive on file? yes Are you interested in receiving information about Advance Directives? no 
 
Reviewed record in preparation for visit and have obtained necessary documentation. Medication reconciliation up to date and corrected with patient at this time.

## 2018-10-01 NOTE — PATIENT INSTRUCTIONS

## 2018-11-01 ENCOUNTER — OFFICE VISIT (OUTPATIENT)
Dept: FAMILY MEDICINE CLINIC | Age: 73
End: 2018-11-01

## 2018-11-01 VITALS
TEMPERATURE: 97.4 F | OXYGEN SATURATION: 97 % | BODY MASS INDEX: 28.79 KG/M2 | HEART RATE: 64 BPM | RESPIRATION RATE: 20 BRPM | DIASTOLIC BLOOD PRESSURE: 78 MMHG | HEIGHT: 73 IN | WEIGHT: 217.2 LBS | SYSTOLIC BLOOD PRESSURE: 138 MMHG

## 2018-11-01 DIAGNOSIS — Z12.11 SCREENING FOR COLON CANCER: ICD-10-CM

## 2018-11-01 DIAGNOSIS — I10 ESSENTIAL HYPERTENSION: Primary | ICD-10-CM

## 2018-11-01 NOTE — PROGRESS NOTES
Identified pt with two pt identifiers(name and ). Chief Complaint Patient presents with  Follow Up Chronic Condition 1 month check up for High Blood Pressure Health Maintenance Due Topic  Shingrix Vaccine Age 50> (1 of 2)  FOBT Q 1 YEAR AGE 50-75  GLAUCOMA SCREENING Q2Y  Pneumococcal 65+ High/Highest Risk (2 of 2 - PPSV23) Wt Readings from Last 3 Encounters:  
10/01/18 217 lb 12.8 oz (98.8 kg) 18 220 lb 9.6 oz (100.1 kg) 18 223 lb 9.6 oz (101.4 kg) Temp Readings from Last 3 Encounters:  
10/01/18 97.6 °F (36.4 °C) (Oral) 18 97.7 °F (36.5 °C) (Oral) 18 98 °F (36.7 °C) (Oral) BP Readings from Last 3 Encounters:  
10/01/18 140/90  
18 (!) 166/99  
18 (!) 158/96 Pulse Readings from Last 3 Encounters:  
10/01/18 70  
18 64  
18 67 Learning Assessment: 
:  
 
Learning Assessment 2016 2016 3/21/2016 PRIMARY LEARNER Patient Patient Patient HIGHEST LEVEL OF EDUCATION - PRIMARY LEARNER  - - 4 YEARS OF COLLEGE  
BARRIERS PRIMARY LEARNER - NONE NONE  
CO-LEARNER CAREGIVER - No No  
PRIMARY LANGUAGE ENGLISH ENGLISH ENGLISH  
LEARNER PREFERENCE PRIMARY DEMONSTRATION DEMONSTRATION DEMONSTRATION  
  - - READING  
  - - LISTENING  
LEARNING SPECIAL TOPICS no no -  
ANSWERED BY patient patient patient RELATIONSHIP SELF SELF SELF  
ASSESSMENT COMMENT none none -  
 
 
Depression Screening: 
:  
 
PHQ over the last two weeks 2018 Little interest or pleasure in doing things Not at all Feeling down, depressed, irritable, or hopeless Not at all Total Score PHQ 2 0 Fall Risk Assessment: 
:  
 
Fall Risk Assessment, last 12 mths 2018 Able to walk? Yes Fall in past 12 months? No  
Fall with injury? -  
Number of falls in past 12 months - Fall Risk Score -  
 
 
Abuse Screening: 
:  
 
Abuse Screening Questionnaire 2017 Do you ever feel afraid of your partner?  Stuart Rendon  
 Are you in a relationship with someone who physically or mentally threatens you? Kati Abarca Is it safe for you to go home? [facility] Coordination of Care Questionnaire: 
:  
 
1) Have you been to an emergency room, urgent care clinic since your last visit? no  
Hospitalized since your last visit? no          
 
2) Have you seen or consulted any other health care providers outside of [facility] since your last visit? no  (Include any pap smears or colon screenings in this section.) 3) Do you have an Advance Directive on file? yes Are you interested in receiving information about Advance Directives? No 
 
Reviewed record in preparation for visit and have obtained necessary documentation. Medication reconciliation up to date and corrected with patient at this time.

## 2018-11-01 NOTE — PATIENT INSTRUCTIONS
Adopt a Mediterranean-style lifestyle: 1. Lots of fresh, locally-grown fruits and vegetables (aim for 7 or more servings a day). 2. Complex carbohydrates like whole grains, nuts, beans and bread (with at least 4 grams of fiber per serving). Avoid the whites  white flour, sugar, white pasta, white rice. 3. Minimally processed foods (5 or fewer listed ingredients on the label). 4. Olive oil as the primary source of fat. 5. Low to moderate amounts of dairy, fish and poultry. 6. Red meat rarely (grass-fed, organic when you do eat it). 7. Low amounts of wine with meals (optional). 8. Unhurried meals with loved ones. 9. Daily exercise (30  60 minutes). 10.  
Resources:  
[employer]  
http://www.SoundHound/health/mediterranean-diet/HF75633 Cookbooks:  
Mediterranean [place] by Nihariak Randolph The Best of Recipes for Health by Niharika Randolph The Zady by Marjorie Khan The Food You Crave by Sona Maza So Easy by Sona Maza Please limit the amount of sodium (salt) in your diet. You should be getting no more than 2300 mg of sodium/salt per day. Remember, if you eat anything that is prepared or comes out of a box, a bag or a can, it has salt in it! Please read labels!

## 2018-11-01 NOTE — PROGRESS NOTES
Subjective:  
 
Bryan Marshall is a 68 y.o. male who presents for follow up of hypertension. I recall that at his last visit, we increased his Carvedilol from 6.25 to 12.5 BID. He reports no adverse symptoms. Tolerating the medication well. Denies any SOB or chest discomfort and pulse has been in the mid to high 60's. Diet and Lifestyle: generally follows a low fat low cholesterol diet, generally follows a low sodium diet, exercises regularly, nonsmoker Home BP Monitoring: is well controlled at home, ranging 130's-170's/70's-80's 
 
Cardiovascular ROS: taking medications as instructed, no medication side effects noted, no TIA's, no chest pain on exertion, no dyspnea on exertion, no swelling of ankles. New concerns:  
None today. Patient Active Problem List  
 Diagnosis Date Noted  Paroxysmal atrial fibrillation ([facility] [address] 07/12/2018  Melanoma of left upper arm ([facility] [address] 08/23/2016  Elevated TSH 06/02/2016 Current Outpatient Medications Medication Sig Dispense Refill  carvedilol (COREG) 12.5 mg tablet Take 1 Tab by mouth two (2) times daily (with meals) for 90 days. 180 Tab 0  
 terazosin (HYTRIN) 2 mg capsule Take 1 Cap by mouth nightly for 90 days. 90 Cap 0  
 PREVNAR 13, PF, 0.5 mL syrg injection TO BE ADMINISTERED BY PHARMACIST FOR IMMUNIZATION  0  
 BENICAR 40 mg tablet Take 40 mg by mouth once over twenty-four (24) hours.  aspirin delayed-release 81 mg tablet Take 81 mg by mouth daily. Allergies Allergen Reactions  Codeine Other (comments) Bad dreams Past Medical History:  
Diagnosis Date  Family history of skin cancer   
 brother-melanoma  Hyperlipidemia  Hypertension  Sun-damaged skin  Thyroid disorder Past Surgical History:  
Procedure Laterality Date  HX TONSILLECTOMY    
 at 10years old Family History Problem Relation Age of Onset  Other Mother   
     hypotension  Heart Failure Father  Cancer Father Prostate  Other Father PUD  Heart Disease Father Heart Valve issue and replacement  Cancer Brother Melanoma -  at 39yrs old  Cancer Sister Hodgkin's in one, melanoma in other  Cancer Paternal Uncle Prostate  Cancer Maternal Grandfather Lung Social History Tobacco Use  Smoking status: Never Smoker  Smokeless tobacco: Never Used Substance Use Topics  Alcohol use: No  
  Alcohol/week: 0.0 oz Review of Systems, additional: 
Pertinent items are noted in HPI. Objective:  
 
Visit Vitals /74 (BP 1 Location: Left arm, BP Patient Position: Sitting) Comment: Manual  
Pulse 64 Temp 97.4 °F (36.3 °C) (Oral) Resp 20 Ht 6' 1\" (1.854 m) Wt 217 lb 3.2 oz (98.5 kg) SpO2 97% BMI 28.66 kg/m² Appearance: alert, well appearing, and in no distress and normal appearing weight. General exam: CVS exam BP noted to be well controlled today in office, S1, S2 normal, no gallop, no murmur, chest clear, no JVD, no HSM, no edema, diabetic exam heart sounds normal rate, regular rhythm, normal S1, S2, no murmurs, rubs, clicks or gallops, chest clear, peripheral vascular exam both carotids normal upstroke without bruits, neurological exam alert, oriented, normal speech, no focal findings or movement disorder noted. Lab review: no lab studies available for review at time of visit. Assessment/Plan: ICD-10-CM ICD-9-CM 1. Essential hypertension I10 401.9 Stable. No change in regimen. Medication seems to be working well. 2. Screening for colon cancer Z12.11 V76.51 REFERRAL TO GASTROENTEROLOGY Reviewed diet, exercise and weight control Reviewed medications and side effects in detail Continue with current dose of medication for BP. Monitor sodium intake - No more than 2300 mg per day.   
 
I have discussed the diagnosis with the patient and the intended treatment plan as seen in the above orders. The patient has received an after-visit summary and questions were answered concerning future plans. Asked to return should symptoms worsen or not improve with treatment. Any pending labs and studies will be relayed to patient when they become available. Pt verbalizes understanding of plan of care and denies further questions or concerns at this time. Follow-up Disposition: 
Return in about 6 months (around 5/1/2019), or if symptoms worsen or fail to improve. Mustapha Gloria MD 
 
Patient Instructions Adopt a Mediterranean-style lifestyle: 1. Lots of fresh, locally-grown fruits and vegetables (aim for 7 or more servings a day). 2. Complex carbohydrates like whole grains, nuts, beans and bread (with at least 4 grams of fiber per serving). Avoid the whites  white flour, sugar, white pasta, white rice. 3. Minimally processed foods (5 or fewer listed ingredients on the label). 4. Olive oil as the primary source of fat. 5. Low to moderate amounts of dairy, fish and poultry. 6. Red meat rarely (grass-fed, organic when you do eat it). 7. Low amounts of wine with meals (optional). 8. Unhurried meals with loved ones. 9. Daily exercise (30  60 minutes). 10.  
Resources:  
[employer]  
http://www.Nugg-it/health/mediterranean-diet/VX75681 Cookbooks:  
Mediterranean [place] by Jong Lr The Best of Recipes for Health by Jong Lr The Wrapp by Marleen Benjamin The Food You Crave by Livier Chapman So Easy by Livier Chapman Please limit the amount of sodium (salt) in your diet. You should be getting no more than 2300 mg of sodium/salt per day. Remember, if you eat anything that is prepared or comes out of a box, a bag or a can, it has salt in it! Please read labels!

## 2018-11-29 ENCOUNTER — OFFICE VISIT (OUTPATIENT)
Dept: DERMATOLOGY | Facility: AMBULATORY SURGERY CENTER | Age: 73
End: 2018-11-29

## 2018-11-29 VITALS
WEIGHT: 217 LBS | HEIGHT: 73 IN | BODY MASS INDEX: 28.76 KG/M2 | HEART RATE: 64 BPM | TEMPERATURE: 97.9 F | OXYGEN SATURATION: 96 % | SYSTOLIC BLOOD PRESSURE: 118 MMHG | DIASTOLIC BLOOD PRESSURE: 78 MMHG

## 2018-11-29 DIAGNOSIS — B35.3 TINEA PEDIS OF BOTH FEET: ICD-10-CM

## 2018-11-29 DIAGNOSIS — L81.4 LENTIGINES: ICD-10-CM

## 2018-11-29 DIAGNOSIS — L82.1 SEBORRHEIC KERATOSES: ICD-10-CM

## 2018-11-29 DIAGNOSIS — L85.3 XEROSIS CUTIS: ICD-10-CM

## 2018-11-29 DIAGNOSIS — D22.9 MULTIPLE BENIGN NEVI: Primary | ICD-10-CM

## 2018-11-29 DIAGNOSIS — D18.01 CHERRY ANGIOMA: ICD-10-CM

## 2018-11-29 DIAGNOSIS — Z85.820 PERSONAL HISTORY OF MALIGNANT MELANOMA OF SKIN: ICD-10-CM

## 2018-11-29 NOTE — PROGRESS NOTES
Written by Isaac Navarro, as dictated by aWldo Ramos, [address]. Name: Claudio Adams       Age: 68 y.o. Date: 11/29/2018    Chief Complaint:   Chief Complaint   Patient presents with    Skin Exam     full skin exam       Subjective:    HPI  Mr. Claudio Adams is a 68 y.o. male who presents for a full skin exam.  The patient's last skin exam was on 07/16/18 and the patient does not have current complaints related to his skin. He states the lesion on his right lateral scalp is still present and growing, however this is not bothersome. He is feeling well and in his usual state of health today. He has no current illnesses, no other skin concerns. His allergies, medications, medical, and social history are reviewed by me today. The patient's pertinent skin history includes : Stage 1a malignant melanoma of the left upper arm (Breslow depth 0.55mm) tx 8/2016 with wide excision    ROS: Constitutional: Negative.     Dermatological : negative    Social History     Socioeconomic History    Marital status:      Spouse name: Not on file    Number of children: Not on file    Years of education: Not on file    Highest education level: Not on file   Social Needs    Financial resource strain: Not on file    Food insecurity - worry: Not on file    Food insecurity - inability: Not on file    Transportation needs - medical: Not on file   Robin needs - non-medical: Not on file   Occupational History    Not on file   Tobacco Use    Smoking status: Never Smoker    Smokeless tobacco: Never Used   Substance and Sexual Activity    Alcohol use: No     Alcohol/week: 0.0 oz    Drug use: No    Sexual activity: No   Other Topics Concern    Not on file   Social History Narrative    Not on file       Family History   Problem Relation Age of Onset    Other Mother         hypotension    Heart Failure Father     Cancer Father         Prostate    Other Father         PUD    Heart Disease Father         Heart Valve issue and replacement    Cancer Brother         Melanoma -  at 39yrs old   [address] [place] [address] Cancer Sister         Hodgkin's in one, melanoma in other    Cancer Paternal Uncle         Prostate    Cancer Maternal Grandfather         Lung       Past Medical History:   Diagnosis Date    Family history of skin cancer     brother-melanoma    Hyperlipidemia     Hypertension     Sun-damaged skin     Thyroid disorder        Past Surgical History:   Procedure Laterality Date    HX TONSILLECTOMY      at 10years old       Current Outpatient Medications   Medication Sig Dispense Refill    carvedilol (COREG) 12.5 mg tablet Take 1 Tab by mouth two (2) times daily (with meals) for 90 days. 180 Tab 0    terazosin (HYTRIN) 2 mg capsule Take 1 Cap by mouth nightly for 90 days. 90 Cap 0    BENICAR 40 mg tablet Take 40 mg by mouth once over twenty-four (24) hours.  PREVNAR 13, PF, 0.5 mL syrg injection TO BE ADMINISTERED BY PHARMACIST FOR IMMUNIZATION  0    aspirin delayed-release 81 mg tablet Take 81 mg by mouth daily. Allergies   Allergen Reactions    Codeine Other (comments)     Bad dreams         Objective:    Visit Vitals  /78 (BP 1 Location: Left arm, BP Patient Position: Sitting)   Pulse 64   Temp 97.9 °F (36.6 °C) (Oral)   Ht 6' 1\" (1.854 m)   Wt 217 lb (98.4 kg)   SpO2 96%   BMI 28.63 kg/m²       Josette Cantrell is a 68 y.o. male who appears well and in no distress. He is awake, alert, and oriented. There is no preauricular, submandibular, or cervical lymphadenopathy. A skin examination was performed including his scalp, face (including eyelids), ears, neck, chest, back, abdomen, upper extremities (including digits/nails), lower extremities, breasts, buttocks; genital skin was not examined. He has a well-healed scar on his left upper arm without pigment, nodularity, or other evidence of lesion recurrence.  There is no associated left axillary palpable lymphadenopathy. He has scattered waxy macules and keratotic papules consistent with seborrheic keratoses. There are lentigines on sun exposed areas. He has pink intradermal nevi and brown junctional nevi, no concerning features for severe atypia. He has scattered red papules consistent with cherry angiomas. He has dry skin on his feet consistent with xerosis cutis. He also has tinea pedis of both feet. Assessment/Plan:    1. Personal history of stage 1a melanoma skin cancer. I discussed sun protection, sunscreen use, the warning signs of skin cancer, the need for self-skin examinations, and the need for regular practitioner exams every 4 months. The patient should follow up sooner as needed if new, changing, or symptomatic skin lesions arise. 2. Seborrheic keratoses. The diagnosis was reviewed and the patient was reassured that no treatment is needed for these benign lesions. 3. Solar lentigos. The diagnosis and relationship to sun exposure was reviewed. Sun protection advised. 4. Normal nevi. The diagnosis of normal nevi was reviewed. I discussed sun protection, sunscreen use, the warning signs of skin cancer, the need for self-skin examinations, and the need for regular practitioner exams every 4 months. The patient should follow up sooner as needed if new, changing, or symptomatic skin lesions arise. 5. Cherry angiomas. The diagnosis was reviewed and the patient was reassured that no treatment is needed for these benign lesions. 6. Xerosis cutis, bilateral feet. The diagnosis was reviewed. I recommended the use of Kerasal.     7. Tinea pedis, bilateral feet. The diagnosis was reviewed. I recommended the use of antifungal cream.     This plan was reviewed with the patient and patient agrees. All questions were answered. This scribe documentation was reviewed by me and accurately reflects the examination and decisions made by me.

## 2019-01-05 DIAGNOSIS — I10 ESSENTIAL HYPERTENSION, BENIGN: ICD-10-CM

## 2019-01-07 RX ORDER — TERAZOSIN 2 MG/1
CAPSULE ORAL
Qty: 90 CAP | Refills: 0 | Status: SHIPPED | OUTPATIENT
Start: 2019-01-07 | End: 2019-02-01 | Stop reason: CLARIF

## 2019-01-09 DIAGNOSIS — I10 ESSENTIAL HYPERTENSION, BENIGN: ICD-10-CM

## 2019-01-09 RX ORDER — TERAZOSIN 2 MG/1
CAPSULE ORAL
Qty: 90 CAP | Refills: 0 | Status: SHIPPED | OUTPATIENT
Start: 2019-01-09 | End: 2019-04-09 | Stop reason: SDUPTHER

## 2019-02-01 RX ORDER — CARVEDILOL 12.5 MG/1
12.5 TABLET ORAL 2 TIMES DAILY WITH MEALS
COMMUNITY
End: 2019-02-11 | Stop reason: DRUGHIGH

## 2019-02-04 ENCOUNTER — ANESTHESIA EVENT (OUTPATIENT)
Dept: ENDOSCOPY | Age: 74
End: 2019-02-04
Payer: MEDICARE

## 2019-02-04 ENCOUNTER — HOSPITAL ENCOUNTER (OUTPATIENT)
Age: 74
Setting detail: OUTPATIENT SURGERY
Discharge: HOME | End: 2019-02-04
Attending: INTERNAL MEDICINE | Admitting: INTERNAL MEDICINE
Payer: MEDICARE

## 2019-02-04 ENCOUNTER — ANESTHESIA (OUTPATIENT)
Dept: ENDOSCOPY | Age: 74
End: 2019-02-04
Payer: MEDICARE

## 2019-02-04 VITALS
WEIGHT: 213 LBS | DIASTOLIC BLOOD PRESSURE: 94 MMHG | BODY MASS INDEX: 28.23 KG/M2 | HEIGHT: 73 IN | HEART RATE: 66 BPM | OXYGEN SATURATION: 98 % | SYSTOLIC BLOOD PRESSURE: 164 MMHG | TEMPERATURE: 97.9 F | RESPIRATION RATE: 14 BRPM

## 2019-02-04 DIAGNOSIS — I48.0 PAROXYSMAL ATRIAL FIBRILLATION (HCC): ICD-10-CM

## 2019-02-04 DIAGNOSIS — Z13.220 SCREENING FOR CHOLESTEROL LEVEL: ICD-10-CM

## 2019-02-04 DIAGNOSIS — Z12.11 SCREENING FOR COLON CANCER: Primary | ICD-10-CM

## 2019-02-04 PROCEDURE — 76040000019: Performed by: INTERNAL MEDICINE

## 2019-02-04 PROCEDURE — 74011000250 HC RX REV CODE- 250

## 2019-02-04 PROCEDURE — 77030009426 HC FCPS BIOP ENDOSC BSC -B: Performed by: INTERNAL MEDICINE

## 2019-02-04 PROCEDURE — 74011250636 HC RX REV CODE- 250/636

## 2019-02-04 PROCEDURE — 88305 TISSUE EXAM BY PATHOLOGIST: CPT

## 2019-02-04 PROCEDURE — 76060000031 HC ANESTHESIA FIRST 0.5 HR: Performed by: INTERNAL MEDICINE

## 2019-02-04 RX ORDER — DEXTROMETHORPHAN/PSEUDOEPHED 2.5-7.5/.8
1.2 DROPS ORAL
Status: DISCONTINUED | OUTPATIENT
Start: 2019-02-04 | End: 2019-02-04 | Stop reason: HOSPADM

## 2019-02-04 RX ORDER — MIDAZOLAM HYDROCHLORIDE 1 MG/ML
.25-5 INJECTION, SOLUTION INTRAMUSCULAR; INTRAVENOUS
Status: DISCONTINUED | OUTPATIENT
Start: 2019-02-04 | End: 2019-02-04 | Stop reason: HOSPADM

## 2019-02-04 RX ORDER — PROPOFOL 10 MG/ML
INJECTION, EMULSION INTRAVENOUS
Status: DISCONTINUED | OUTPATIENT
Start: 2019-02-04 | End: 2019-02-04 | Stop reason: HOSPADM

## 2019-02-04 RX ORDER — EPINEPHRINE 0.1 MG/ML
1 INJECTION INTRACARDIAC; INTRAVENOUS
Status: DISCONTINUED | OUTPATIENT
Start: 2019-02-04 | End: 2019-02-04 | Stop reason: HOSPADM

## 2019-02-04 RX ORDER — ATROPINE SULFATE 0.1 MG/ML
0.4 INJECTION INTRAVENOUS
Status: DISCONTINUED | OUTPATIENT
Start: 2019-02-04 | End: 2019-02-04 | Stop reason: HOSPADM

## 2019-02-04 RX ORDER — NALOXONE HYDROCHLORIDE 0.4 MG/ML
0.4 INJECTION, SOLUTION INTRAMUSCULAR; INTRAVENOUS; SUBCUTANEOUS
Status: DISCONTINUED | OUTPATIENT
Start: 2019-02-04 | End: 2019-02-04 | Stop reason: HOSPADM

## 2019-02-04 RX ORDER — PROPOFOL 10 MG/ML
INJECTION, EMULSION INTRAVENOUS AS NEEDED
Status: DISCONTINUED | OUTPATIENT
Start: 2019-02-04 | End: 2019-02-04 | Stop reason: HOSPADM

## 2019-02-04 RX ORDER — SODIUM CHLORIDE 9 MG/ML
50 INJECTION, SOLUTION INTRAVENOUS CONTINUOUS
Status: DISCONTINUED | OUTPATIENT
Start: 2019-02-04 | End: 2019-02-04 | Stop reason: HOSPADM

## 2019-02-04 RX ORDER — GLYCOPYRROLATE 0.2 MG/ML
INJECTION INTRAMUSCULAR; INTRAVENOUS AS NEEDED
Status: DISCONTINUED | OUTPATIENT
Start: 2019-02-04 | End: 2019-02-04 | Stop reason: HOSPADM

## 2019-02-04 RX ORDER — FLUMAZENIL 0.1 MG/ML
0.2 INJECTION INTRAVENOUS
Status: DISCONTINUED | OUTPATIENT
Start: 2019-02-04 | End: 2019-02-04 | Stop reason: HOSPADM

## 2019-02-04 RX ORDER — ATROPINE SULFATE 0.1 MG/ML
INJECTION INTRAVENOUS AS NEEDED
Status: DISCONTINUED | OUTPATIENT
Start: 2019-02-04 | End: 2019-02-04 | Stop reason: HOSPADM

## 2019-02-04 RX ADMIN — PROPOFOL 30 MG: 10 INJECTION, EMULSION INTRAVENOUS at 13:51

## 2019-02-04 RX ADMIN — PROPOFOL 50 MG: 10 INJECTION, EMULSION INTRAVENOUS at 13:48

## 2019-02-04 RX ADMIN — GLYCOPYRROLATE 0.2 MG: 0.2 INJECTION INTRAMUSCULAR; INTRAVENOUS at 13:56

## 2019-02-04 RX ADMIN — PROPOFOL 75 MCG/KG/MIN: 10 INJECTION, EMULSION INTRAVENOUS at 13:48

## 2019-02-04 RX ADMIN — ATROPINE SULFATE 0.2 MG: 0.1 INJECTION INTRAVENOUS at 13:56

## 2019-02-04 RX ADMIN — PROPOFOL 20 MG: 10 INJECTION, EMULSION INTRAVENOUS at 13:52

## 2019-02-04 NOTE — H&P
Sarahy Barcenas M.D. 
[phone] History and Physical    
 
NAME:  Chidi Garcia :   1945 MRN:   [id number] Referring Physician:  Dr. Belem Cortes Consult Date: 2019 1:40 PM 
 
Chief Complaint:  Colon cancer screening History of Present Illness:  Patient is a 68 y.o. who is seen for colon cancer screening. Denies any ongoing GI complaints. PMH: 
Past Medical History:  
Diagnosis Date  Family history of skin cancer   
 brother-melanoma  Hyperlipidemia  Hypertension  Sun-damaged skin PSH: 
Past Surgical History:  
Procedure Laterality Date  HX TONSILLECTOMY    
 at 10years old Allergies: Allergies Allergen Reactions  Codeine Other (comments) Bad dreams Home Medications: 
Prior to Admission Medications Prescriptions Last Dose Informant Patient Reported? Taking? BENICAR 40 mg tablet 2019 at Unknown time  Yes Yes Sig: Take 40 mg by mouth once over twenty-four (24) hours. carvedilol (COREG) 12.5 mg tablet 2019 at Unknown time  Yes Yes Sig: Take 12.5 mg by mouth two (2) times daily (with meals). terazosin (HYTRIN) 2 mg capsule 2/3/2019 at Unknown time  No Yes Sig: TAKE ONE CAPSULE BY MOUTH AT BEDTIME Facility-Administered Medications: None Hospital Medications: 
Current Facility-Administered Medications Medication Dose Route Frequency  0.9% sodium chloride infusion  50 mL/hr IntraVENous CONTINUOUS  
 midazolam (VERSED) injection 0.25-5 mg  0.25-5 mg IntraVENous Multiple  naloxone (NARCAN) injection 0.4 mg  0.4 mg IntraVENous Multiple  flumazenil (ROMAZICON) 0.1 mg/mL injection 0.2 mg  0.2 mg IntraVENous Multiple  simethicone (MYLICON) 20HU/5.2XF oral drops 80 mg  1.2 mL Oral Multiple  atropine injection 0.4 mg  0.4 mg IntraVENous ONCE PRN  
 EPINEPHrine (ADRENALIN) 0.1 mg/mL syringe 1 mg  1 mg Endoscopically ONCE PRN Social History: 
Social History Tobacco Use  Smoking status: Never Smoker  Smokeless tobacco: Never Used Substance Use Topics  Alcohol use: No  
  Alcohol/week: 0.0 oz Family History: 
Family History Problem Relation Age of Onset  Other Mother   
     hypotension  Heart Failure Father  Cancer Father Prostate  Other Father PUD  Heart Disease Father Heart Valve issue and replacement  Cancer Brother Melanoma -  at 39yrs old  Cancer Sister Hodgkin's in one, melanoma in other  Cancer Paternal Uncle Prostate  Cancer Maternal Grandfather Lung Review of Systems: 
 
 
Constitutional: negative fever, negative chills, negative weight loss Eyes:   negative visual changes ENT:   negative sore throat, tongue or lip swelling Respiratory:  negative cough, negative dyspnea Cards:  negative for chest pain, palpitations, lower extremity edema GI:   See HPI 
:  negative for frequency, dysuria Integument:  negative for rash and pruritus Heme:  negative for easy bruising and gum/nose bleeding Musculoskel: negative for myalgias,  back pain and muscle weakness Neuro: negative for headaches, dizziness, vertigo Psych:  negative for feelings of anxiety, depression Objective:  
 
Patient Vitals for the past 8 hrs: 
 BP Temp Pulse Resp SpO2 Height Weight  
19 1238 (!) 177/91 98.2 °F (36.8 °C) 67 15 97 % 6' 1\" (1.854 m) 96.6 kg (213 lb) No intake/output data recorded. No intake/output data recorded. EXAM:   
 NEURO-a&o HEENT-wnl LUNGS-clear COR-regular rate and rhythym ABD-soft , no tenderness, no rebound, good bs EXT-no edema Data Review No results for input(s): WBC, HGB, HCT, PLT, HGBEXT, HCTEXT, PLTEXT in the last 72 hours. No results for input(s): NA, K, CL, CO2, BUN, CREA, GLU, PHOS, CA in the last 72 hours.  
No results for input(s): SGOT, GPT, AP, TBIL, TP, ALB, GLOB, GGT, AML, LPSE in the last 72 hours. No lab exists for component: AMYP, HLPSE No results for input(s): INR, PTP, APTT in the last 72 hours. No lab exists for component: INREXT Patient Active Problem List  
Diagnosis Code  Elevated TSH R79.89  
 Melanoma of left upper arm (HCC) C43.62  Paroxysmal atrial fibrillation (HCC) I48.0 Assessment:  
· Colon cancer screening Plan:  
· Colonoscopy today.   
 
Signed By: Katarzyna Balderrama MD   
 2/4/2019  1:40 PM

## 2019-02-04 NOTE — ROUTINE PROCESS
Hernandez Oconnell 1945 
[id number] Situation: 
Verbal report received from: [facility] Procedure: Procedure(s): 
COLONOSCOPY 
ENDOSCOPIC POLYPECTOMY Background: 
 
Preoperative diagnosis: SCREENING Postoperative diagnosis: polyps 
hemorrhoids :  Dr. Jayda Wilson Assistant(s): Endoscopy RN-1: Brennon Mercado RN Endoscopy RN-2: Clay Gutierrez RN Specimens:  
ID Type Source Tests Collected by Time Destination 1 : Ascending Colon Polyp Preservative   Adele Ellis MD 2/4/2019 1401 Pathology H. Pylori  no Assessment: 
Intra-procedure medications Anesthesia gave intra-procedure sedation and medications, see anesthesia flow sheet yes Intravenous fluids: NS@ Kayley Marcus Vital signs stable Abdominal assessment: round and soft Recommendation: 
Discharge patient per MD order. Family or Friend Permission to share finding with family or friend yes

## 2019-02-04 NOTE — ANESTHESIA PREPROCEDURE EVALUATION
Anesthetic History No history of anesthetic complications Review of Systems / Medical History Patient summary reviewed, nursing notes reviewed and pertinent labs reviewed Pulmonary Within defined limits Neuro/Psych Within defined limits Cardiovascular Hypertension Dysrhythmias (10 years ago. no therapy) : atrial fibrillation GI/Hepatic/Renal 
Within defined limits Endo/Other Within defined limits Other Findings Physical Exam 
 
Airway Mallampati: III 
TM Distance: 4 - 6 cm Neck ROM: normal range of motion Mouth opening: Normal 
 
 Cardiovascular Rhythm: regular Rate: normal 
 
 
 
 Dental 
No notable dental hx Pulmonary Breath sounds clear to auscultation Abdominal 
GI exam deferred Other Findings Anesthetic Plan ASA: 2 Anesthesia type: MAC Induction: Intravenous Anesthetic plan and risks discussed with: Patient

## 2019-02-04 NOTE — DISCHARGE INSTRUCTIONS
[facility]. Marta Holm M.D.  [phone]            COLON DISCHARGE INSTRUCTIONS       2019    Madison Martinez  :  1945  [place] Medical Record Number:  [id number]      COLONOSCOPY FINDINGS:  Your colonoscopy showed one diminutive polyp that was removed and sent to pathology, and small internal hemorrhoids, otherwise mucosa within normal.    DISCOMFORT:  Redness at IV site- apply warm compress to area; if redness or soreness persist- contact your physician  There may be a slight amount of blood passed from the rectum  Gaseous discomfort- walking, belching will help relieve any discomfort  You may not operate a vehicle for 12 hours  You may not engage in an occupation involving machinery or appliances for rest of today  You may not drink alcoholic beverages for at least 12 hours  Avoid making any critical decisions for at least 24 hour  DIET:   High fiber diet. - however -  remember your colon is empty and a heavy meal will produce gas. Avoid these foods:  vegetables, fried / greasy foods, carbonated drinks for today     ACTIVITY:  You may resume your normal daily activities it is recommended that you spend the remainder of the day resting -  avoid any strenuous activity. CALL M.D. ANY SIGN OF:   Increasing pain, nausea, vomiting  Abdominal distension (swelling)  New increased bleeding (oral or rectal)  Fever (chills)  Pain in chest area  Bloody discharge from nose or mouth   Shortness of breath    Follow-up Instructions:   Call Dr. Leandro Ji if any questions or problems. Telephone # [phone]  Biopsy results will be available in  5 to 7 days  Should have a repeat colonoscopy in 5 years if polyp shows adenoma.

## 2019-02-04 NOTE — ANESTHESIA POSTPROCEDURE EVALUATION
Procedure(s): 
COLONOSCOPY 
ENDOSCOPIC POLYPECTOMY. Anesthesia Post Evaluation Multimodal analgesia: multimodal analgesia not used between 6 hours prior to anesthesia start to PACU discharge Patient location during evaluation: bedside Patient participation: complete - patient participated Level of consciousness: awake Pain management: adequate Airway patency: patent Anesthetic complications: no 
Cardiovascular status: acceptable Respiratory status: acceptable Hydration status: acceptable Post anesthesia nausea and vomiting:  none Visit Vitals BP (!) 177/91 Pulse 67 Temp 36.8 °C (98.2 °F) Resp 15 Ht 6' 1\" (1.854 m) Wt 96.6 kg (213 lb) SpO2 97% BMI 28.10 kg/m²

## 2019-02-04 NOTE — PROGRESS NOTES
Report received from [facility] ([facility]). See anesthesia note. ABD remains soft and non-tender post procedure. Pt has no complaints at this time and tolerated the procedure well. Endoscope was pre-cleaned at bedside immediately following [facility]

## 2019-02-04 NOTE — PROCEDURES
Reji Jaramillo M.D.  [phone]            2019          Colonoscopy Operative Report  Christa Dunbar  :  1945  [facility] Medical Record Number:  [id number]      Indications:    Screening colonoscopy     :  Beba Carmona MD    Referring Provider: Nathaly Vincent MD    Sedation:  MAC anesthesia    Pre-Procedural Exam:      Airway: clear,  No airway problems anticipated  Heart: RRR, without gallops or rubs  Lungs: clear bilaterally without wheezes, crackles, or rhonchi  Abdomen: soft, nontender, nondistended, bowel sounds present  Mental Status: awake, alert and oriented to person, place and time     Procedure Details:  After informed consent was obtained with all risks and benefits of procedure explained and preoperative exam completed, the patient was taken to the endoscopy suite and placed in the left lateral decubitus position. Upon sequential sedation as per above, a digital rectal exam was performed. The Olympus videocolonoscope  was inserted in the rectum and carefully advanced to the cecum, which was identified by the ileocecal valve and appendiceal orifice. The quality of preparation was good. The colonoscope was slowly withdrawn with careful inspection and evaluation between folds. Retroflexion in the rectum was performed. Findings:   Terminal Ileum: not intubated  Cecum: normal  Ascending Colon: 1  Sessile polyp(s), the largest 2 mm in size;  Transverse Colon: normal  Descending Colon: normal  Sigmoid: normal  Rectum: no mucosal lesion appreciated  Grade 1 internal hemorrhoid(s); Interventions:  1 complete polypectomy were performed using cold biopsy forceps and the polyps were  retrieved    Specimen Removed:  specimen #1, 2 mm in size, located in the ascending colon removed by cold biopsy and sent for pathology    Complications: None. EBL:  None.     Impression:  One diminutive polyp removed and sent to pathology, otherwise mucosa within normal.                         Small internal hemorrhoids    Recommendations:  -If adenoma is present, repeat colonoscopy in 5 years.   -High fiber diet.    -Resume normal medication(s). Discharge Disposition:  Home in the company of a  when able to ambulate.     Teresa Rosen MD  2/4/2019  2:05 PM

## 2019-02-06 ENCOUNTER — TELEPHONE (OUTPATIENT)
Dept: FAMILY MEDICINE CLINIC | Age: 74
End: 2019-02-06

## 2019-02-06 ENCOUNTER — HOSPITAL ENCOUNTER (OUTPATIENT)
Dept: LAB | Age: 74
Discharge: HOME | End: 2019-02-06
Payer: MEDICARE

## 2019-02-06 PROCEDURE — 85027 COMPLETE CBC AUTOMATED: CPT

## 2019-02-06 PROCEDURE — 36415 COLL VENOUS BLD VENIPUNCTURE: CPT

## 2019-02-06 PROCEDURE — 80053 COMPREHEN METABOLIC PANEL: CPT

## 2019-02-06 PROCEDURE — 80061 LIPID PANEL: CPT

## 2019-02-06 NOTE — TELEPHONE ENCOUNTER
Pt was seen today for lab only visit to have fasting labs done. [facility] technician reports pt declined stool kit as he had a colonoscopy on Monday, 02/04/19.

## 2019-02-07 LAB
ALBUMIN SERPL-MCNC: 4.4 G/DL (ref 3.5–4.8)
ALBUMIN/GLOB SERPL: 1.8 {RATIO} (ref 1.2–2.2)
ALP SERPL-CCNC: 76 IU/L (ref 39–117)
ALT SERPL-CCNC: 12 IU/L (ref 0–44)
AST SERPL-CCNC: 17 IU/L (ref 0–40)
BILIRUB SERPL-MCNC: 0.9 MG/DL (ref 0–1.2)
BUN SERPL-MCNC: 13 MG/DL (ref 8–27)
BUN/CREAT SERPL: 14 (ref 10–24)
CALCIUM SERPL-MCNC: 9.2 MG/DL (ref 8.6–10.2)
CHLORIDE SERPL-SCNC: 101 MMOL/L (ref 96–106)
CHOLEST SERPL-MCNC: 162 MG/DL (ref 100–199)
CO2 SERPL-SCNC: 26 MMOL/L (ref 20–29)
CREAT SERPL-MCNC: 0.94 MG/DL (ref 0.76–1.27)
ERYTHROCYTE [DISTWIDTH] IN BLOOD BY AUTOMATED COUNT: 14.3 % (ref 12.3–15.4)
GLOBULIN SER CALC-MCNC: 2.5 G/DL (ref 1.5–4.5)
GLUCOSE SERPL-MCNC: 94 MG/DL (ref 65–99)
HCT VFR BLD AUTO: 41.8 % (ref 37.5–51)
HDLC SERPL-MCNC: 41 MG/DL
HGB BLD-MCNC: 14 G/DL (ref 13–17.7)
INTERPRETATION, 910389: NORMAL
LDLC SERPL CALC-MCNC: 106 MG/DL (ref 0–99)
MCH RBC QN AUTO: 30 PG (ref 26.6–33)
MCHC RBC AUTO-ENTMCNC: 33.5 G/DL (ref 31.5–35.7)
MCV RBC AUTO: 90 FL (ref 79–97)
PLATELET # BLD AUTO: 231 X10E3/UL (ref 150–379)
POTASSIUM SERPL-SCNC: 4.2 MMOL/L (ref 3.5–5.2)
PROT SERPL-MCNC: 6.9 G/DL (ref 6–8.5)
RBC # BLD AUTO: 4.67 X10E6/UL (ref 4.14–5.8)
SODIUM SERPL-SCNC: 142 MMOL/L (ref 134–144)
TRIGL SERPL-MCNC: 73 MG/DL (ref 0–149)
VLDLC SERPL CALC-MCNC: 15 MG/DL (ref 5–40)
WBC # BLD AUTO: 5.2 X10E3/UL (ref 3.4–10.8)

## 2019-02-07 NOTE — PROGRESS NOTES
Pt is following up with appointment with Dr My Caldwell next week in regards to routine follow up and labs.

## 2019-02-11 ENCOUNTER — OFFICE VISIT (OUTPATIENT)
Dept: FAMILY MEDICINE CLINIC | Age: 74
End: 2019-02-11

## 2019-02-11 VITALS
DIASTOLIC BLOOD PRESSURE: 90 MMHG | OXYGEN SATURATION: 97 % | SYSTOLIC BLOOD PRESSURE: 156 MMHG | WEIGHT: 221 LBS | HEIGHT: 73 IN | TEMPERATURE: 97.7 F | HEART RATE: 67 BPM | RESPIRATION RATE: 20 BRPM | BODY MASS INDEX: 29.29 KG/M2

## 2019-02-11 DIAGNOSIS — I10 ESSENTIAL HYPERTENSION: Primary | ICD-10-CM

## 2019-02-11 DIAGNOSIS — I48.0 PAROXYSMAL ATRIAL FIBRILLATION (HCC): ICD-10-CM

## 2019-02-11 DIAGNOSIS — C43.62 MELANOMA OF LEFT UPPER ARM (HCC): ICD-10-CM

## 2019-02-11 NOTE — PROGRESS NOTES
Identified pt with two pt identifiers(name and ). Chief Complaint Patient presents with  Medication Refill Check up for medication refill Health Maintenance Due Topic  Shingrix Vaccine Age 50> (1 of 2)  FOBT Q 1 YEAR AGE 50-75  GLAUCOMA SCREENING Q2Y Wt Readings from Last 3 Encounters:  
19 213 lb (96.6 kg)  
18 217 lb (98.4 kg) 18 217 lb 3.2 oz (98.5 kg) Temp Readings from Last 3 Encounters:  
19 97.9 °F (36.6 °C)  
18 97.9 °F (36.6 °C) (Oral) 18 97.4 °F (36.3 °C) (Oral) BP Readings from Last 3 Encounters:  
19 (!) 164/94  
18 118/78  
18 138/78 Pulse Readings from Last 3 Encounters:  
19 66  
18 64  
18 64 Learning Assessment: 
:  
 
Learning Assessment 2016 2016 3/21/2016 PRIMARY LEARNER Patient Patient Patient HIGHEST LEVEL OF EDUCATION - PRIMARY LEARNER  - - 4 YEARS OF COLLEGE  
BARRIERS PRIMARY LEARNER - NONE NONE  
CO-LEARNER CAREGIVER - No No  
PRIMARY LANGUAGE ENGLISH ENGLISH ENGLISH  
LEARNER PREFERENCE PRIMARY DEMONSTRATION DEMONSTRATION DEMONSTRATION  
  - - READING  
  - - LISTENING  
LEARNING SPECIAL TOPICS no no -  
ANSWERED BY patient patient patient RELATIONSHIP SELF SELF SELF  
ASSESSMENT COMMENT none none -  
 
 
Depression Screening: 
:  
 
PHQ over the last two weeks 2019 Little interest or pleasure in doing things Not at all Feeling down, depressed, irritable, or hopeless Not at all Total Score PHQ 2 0 Fall Risk Assessment: 
:  
 
Fall Risk Assessment, last 12 mths 2019 Able to walk? Yes Fall in past 12 months? No  
Fall with injury? -  
Number of falls in past 12 months - Fall Risk Score -  
 
 
Abuse Screening: 
:  
 
Abuse Screening Questionnaire 2017 Do you ever feel afraid of your partner? Ty Juarez Are you in a relationship with someone who physically or mentally threatens you?  Ty Juarez  
 Is it safe for you to go home[place] Coordination of Care Questionnaire: 
:  
 
1) Have you been to an emergency room, urgent care clinic since your last visit? no  
Hospitalized since your last visit? no          
 
2) Have you seen or consulted any other health care providers outside of [address] [place] [address] since your last visit? yes  GI (Include any pap smears or colon screenings in this section.) 3) Do you have an Advance Directive on file? yes Are you interested in receiving information about Advance Directives? no 
 
Reviewed record in preparation for visit and have obtained necessary documentation. Medication reconciliation up to date and corrected with patient at this time.

## 2019-02-11 NOTE — PROGRESS NOTES
Chief Complaint: 
Chief Complaint Patient presents with  Medication Refill Check up for medication refill History of Present Illness: He is a 68 y.o. male who presents today for follow up of Hypertension and response to Coreg He continues to have episodes of elevated BP and today, his BP is again elevated. HR is stable in the high 60's. Reviewed PmHx, RxHx, FmHx, SocHx, AllgHx and updated and dated in the chart. Patient Active Problem List  
 Diagnosis  Paroxysmal atrial fibrillation (HCC)  Melanoma of left upper arm (HCC)  
  0.55 mm Felix II-III, regression, 2 mitoses, excised with 1 cm margins 8/23/16  Elevated TSH Review of Systems - negative except as listed above in the HPI Objective:  
 
Vitals:  
 02/11/19 1316 BP: 156/90 Pulse: 67 Resp: 20 Temp: 97.7 °F (36.5 °C) TempSrc: Oral  
SpO2: 97% Weight: 221 lb (100.2 kg) Height: 6' 1\" (1.854 m) Physical Examination:  
General appearance - alert, well appearing, and in no distress and normal appearing weight Chest - clear to auscultation, no wheezes, rales or rhonchi, symmetric air entry Heart - normal rate, regular rhythm, normal S1, S2, no murmurs, rubs, clicks or gallops Neurological - alert, oriented, normal speech, no focal findings or movement disorder noted Musculoskeletal - no joint tenderness, deformity or swelling Extremities - peripheral pulses normal, no pedal edema, no clubbing or cyanosis Skin - normal coloration and turgor, no rashes, no suspicious skin lesions noted Assessment/ Plan:  
Diagnoses and all orders for this visit: 1. Essential hypertension Anticipatory guidance discussed.  updated. Increased Carvedilol to 12.5 mgs BID. Benicar 40 mgs daily. DASH diet - outlined below. 2. Melanoma of left upper arm ([facility] [address] Assessment & Plan: 
 
Key Oncology Meds Patient is on no Oncologic meds. Key Pain Meds The patient is on no pain meds. Lab Results Component Value Date/Time WBC 5.2 02/06/2019 08:31 AM  
 ABS. NEUTROPHILS 3.6 07/11/2018 08:32 AM  
 HGB 14.0 02/06/2019 08:31 AM  
 HCT 41.8 02/06/2019 08:31 AM  
 PLATELET 729 48/76/6758 08:31 AM  
 Creatinine 0.94 02/06/2019 08:31 AM  
 BUN 13 02/06/2019 08:31 AM  
 ALT (SGPT) 12 02/06/2019 08:31 AM  
 AST (SGOT) 17 02/06/2019 08:31 AM  
 Albumin 4.4 02/06/2019 08:31 AM  
 Prostate Specific Ag 2.6 07/11/2018 08:32 AM  
 
 
 
3. Paroxysmal atrial fibrillation (HCC) Assessment & Plan: 
Stable, based on history, physical exam and review of pertinent labs, studies and medications; meds reconciled; continue current treatment plan. Key CAD CHF Meds   
    
  
 carvedilol (COREG) 12.5 mg tablet  (Taking) Take 12.5 mg by mouth two (2) times daily (with meals). terazosin (HYTRIN) 2 mg capsule  (Taking) TAKE ONE CAPSULE BY MOUTH AT BEDTIME BENICAR 40 mg tablet  (Taking) Take 40 mg by mouth once over twenty-four (24) hours. Lab Results Component Value Date/Time Sodium 142 02/06/2019 08:31 AM  
 Potassium 4.2 02/06/2019 08:31 AM  
 Cholesterol, total 162 02/06/2019 08:31 AM  
 HDL Cholesterol 41 02/06/2019 08:31 AM  
 LDL, calculated 106 02/06/2019 08:31 AM  
 Triglyceride 73 02/06/2019 08:31 AM  
 
I have discussed the diagnosis with the patient and the intended treatment plan as seen in the above orders. The patient has received an after-visit summary and questions were answered concerning future plans. Asked to return should symptoms worsen or not improve with treatment. Any pending labs and studies will be relayed to patient when they become available. Pt verbalizes understanding of plan of care and denies further questions or concerns at this time. Follow-up Disposition: 
Return in about 4 weeks (around 3/11/2019), or if symptoms worsen or fail to improve. Laura Waldrop MD 
 
Patient Instructions DASH Diet: Care Instructions Your Care Instructions The DASH diet is an eating plan that can help lower your blood pressure. DASH stands for Dietary Approaches to Stop Hypertension. Hypertension is high blood pressure. The DASH diet focuses on eating foods that are high in calcium, potassium, and magnesium. These nutrients can lower blood pressure. The foods that are highest in these nutrients are fruits, vegetables, low-fat dairy products, nuts, seeds, and legumes. But taking calcium, potassium, and magnesium supplements instead of eating foods that are high in those nutrients does not have the same effect. The DASH diet also includes whole grains, fish, and poultry. The DASH diet is one of several lifestyle changes your doctor may recommend to lower your high blood pressure. Your doctor may also want you to decrease the amount of sodium in your diet. Lowering sodium while following the DASH diet can lower blood pressure even further than just the DASH diet alone. Follow-up care is a key part of your treatment and safety. Be sure to make and go to all appointments, and call your doctor if you are having problems. It's also a good idea to know your test results and keep a list of the medicines you take. How can you care for yourself at home? Following the DASH diet · Eat 4 to 5 servings of fruit each day. A serving is 1 medium-sized piece of fruit, ½ cup chopped or canned fruit, 1/4 cup dried fruit, or 4 ounces (½ cup) of fruit juice. Choose fruit more often than fruit juice. · Eat 4 to 5 servings of vegetables each day. A serving is 1 cup of lettuce or raw leafy vegetables, ½ cup of chopped or cooked vegetables, or 4 ounces (½ cup) of vegetable juice. Choose vegetables more often than vegetable juice. · Get 2 to 3 servings of low-fat and fat-free dairy each day. A serving is 8 ounces of milk, 1 cup of yogurt, or 1 ½ ounces of cheese. · Eat 6 to 8 servings of grains each day.  A serving is 1 slice of bread, 1 ounce of dry cereal, or ½ cup of cooked rice, pasta, or cooked cereal. Try to choose whole-grain products as much as possible. · Limit lean meat, poultry, and fish to 2 servings each day. A serving is 3 ounces, about the size of a deck of cards. · Eat 4 to 5 servings of nuts, seeds, and legumes (cooked dried beans, lentils, and split peas) each week. A serving is 1/3 cup of nuts, 2 tablespoons of seeds, or ½ cup of cooked beans or peas. · Limit fats and oils to 2 to 3 servings each day. A serving is 1 teaspoon of vegetable oil or 2 tablespoons of salad dressing. · Limit sweets and added sugars to 5 servings or less a week. A serving is 1 tablespoon jelly or jam, ½ cup sorbet, or 1 cup of lemonade. · Eat less than 2,300 milligrams (mg) of sodium a day. If you limit your sodium to 1,500 mg a day, you can lower your blood pressure even more. Tips for success · Start small. Do not try to make dramatic changes to your diet all at once. You might feel that you are missing out on your favorite foods and then be more likely to not follow the plan. Make small changes, and stick with them. Once those changes become habit, add a few more changes. · Try some of the following: ? Make it a goal to eat a fruit or vegetable at every meal and at snacks. This will make it easy to get the recommended amount of fruits and vegetables each day. ? Try yogurt topped with fruit and nuts for a snack or healthy dessert. ? Add lettuce, tomato, cucumber, and onion to sandwiches. ? Combine a ready-made pizza crust with low-fat mozzarella cheese and lots of vegetable toppings. Try using tomatoes, squash, spinach, broccoli, carrots, cauliflower, and onions. ? Have a variety of cut-up vegetables with a low-fat dip as an appetizer instead of chips and dip. ? Sprinkle sunflower seeds or chopped almonds over salads. Or try adding chopped walnuts or almonds to cooked vegetables. ? Try some vegetarian meals using beans and peas. Add garbanzo or kidney beans to salads. Make burritos and tacos with mashed talamantes beans or black beans. Where can you learn more? Go to http://allie-valdemar.info/. Enter E579 in the search box to learn more about \"DASH Diet: Care Instructions. \" Current as of: July 22, 2018 Content Version: 11.9 © 2915-9291 [employer]. Care instructions adapted under license by [employer] (which disclaims liability or warranty for this information). If you have questions about a medical condition or this instruction, always ask your healthcare professional. [address] any warranty or liability for your use of this information.

## 2019-02-11 NOTE — PATIENT INSTRUCTIONS
DASH Diet: Care Instructions Your Care Instructions The DASH diet is an eating plan that can help lower your blood pressure. DASH stands for Dietary Approaches to Stop Hypertension. Hypertension is high blood pressure. The DASH diet focuses on eating foods that are high in calcium, potassium, and magnesium. These nutrients can lower blood pressure. The foods that are highest in these nutrients are fruits, vegetables, low-fat dairy products, nuts, seeds, and legumes. But taking calcium, potassium, and magnesium supplements instead of eating foods that are high in those nutrients does not have the same effect. The DASH diet also includes whole grains, fish, and poultry. The DASH diet is one of several lifestyle changes your doctor may recommend to lower your high blood pressure. Your doctor may also want you to decrease the amount of sodium in your diet. Lowering sodium while following the DASH diet can lower blood pressure even further than just the DASH diet alone. Follow-up care is a key part of your treatment and safety. Be sure to make and go to all appointments, and call your doctor if you are having problems. It's also a good idea to know your test results and keep a list of the medicines you take. How can you care for yourself at home? Following the DASH diet · Eat 4 to 5 servings of fruit each day. A serving is 1 medium-sized piece of fruit, ½ cup chopped or canned fruit, 1/4 cup dried fruit, or 4 ounces (½ cup) of fruit juice. Choose fruit more often than fruit juice. · Eat 4 to 5 servings of vegetables each day. A serving is 1 cup of lettuce or raw leafy vegetables, ½ cup of chopped or cooked vegetables, or 4 ounces (½ cup) of vegetable juice. Choose vegetables more often than vegetable juice. · Get 2 to 3 servings of low-fat and fat-free dairy each day. A serving is 8 ounces of milk, 1 cup of yogurt, or 1 ½ ounces of cheese. · Eat 6 to 8 servings of grains each day. A serving is 1 slice of bread, 1 ounce of dry cereal, or ½ cup of cooked rice, pasta, or cooked cereal. Try to choose whole-grain products as much as possible. · Limit lean meat, poultry, and fish to 2 servings each day. A serving is 3 ounces, about the size of a deck of cards. · Eat 4 to 5 servings of nuts, seeds, and legumes (cooked dried beans, lentils, and split peas) each week. A serving is 1/3 cup of nuts, 2 tablespoons of seeds, or ½ cup of cooked beans or peas. · Limit fats and oils to 2 to 3 servings each day. A serving is 1 teaspoon of vegetable oil or 2 tablespoons of salad dressing. · Limit sweets and added sugars to 5 servings or less a week. A serving is 1 tablespoon jelly or jam, ½ cup sorbet, or 1 cup of lemonade. · Eat less than 2,300 milligrams (mg) of sodium a day. If you limit your sodium to 1,500 mg a day, you can lower your blood pressure even more. Tips for success · Start small. Do not try to make dramatic changes to your diet all at once. You might feel that you are missing out on your favorite foods and then be more likely to not follow the plan. Make small changes, and stick with them. Once those changes become habit, add a few more changes. · Try some of the following: ? Make it a goal to eat a fruit or vegetable at every meal and at snacks. This will make it easy to get the recommended amount of fruits and vegetables each day. ? Try yogurt topped with fruit and nuts for a snack or healthy dessert. ? Add lettuce, tomato, cucumber, and onion to sandwiches. ? Combine a ready-made pizza crust with low-fat mozzarella cheese and lots of vegetable toppings. Try using tomatoes, squash, spinach, broccoli, carrots, cauliflower, and onions. ? Have a variety of cut-up vegetables with a low-fat dip as an appetizer instead of chips and dip. ? Sprinkle sunflower seeds or chopped almonds over salads.  Or try adding chopped walnuts or almonds to cooked vegetables. ? Try some vegetarian meals using beans and peas. Add garbanzo or kidney beans to salads. Make burritos and tacos with mashed talamantes beans or black beans. Where can you learn more? Go to http://allie-valdemar.info/. Enter Z806 in the search box to learn more about \"DASH Diet: Care Instructions. \" Current as of: July 22, 2018 Content Version: 11.9 © 1983-0961 [employer]. Care instructions adapted under license by [employer] (which disclaims liability or warranty for this information). If you have questions about a medical condition or this instruction, always ask your healthcare professional. [address] any warranty or liability for your use of this information.

## 2019-02-11 NOTE — ASSESSMENT & PLAN NOTE
Key Oncology Meds Patient is on no Oncologic meds. Key Pain Meds The patient is on no pain meds. Lab Results Component Value Date/Time WBC 5.2 02/06/2019 08:31 AM  
 ABS.  NEUTROPHILS 3.6 07/11/2018 08:32 AM  
 HGB 14.0 02/06/2019 08:31 AM  
 HCT 41.8 02/06/2019 08:31 AM  
 PLATELET 225 37/65/5117 08:31 AM  
 Creatinine 0.94 02/06/2019 08:31 AM  
 BUN 13 02/06/2019 08:31 AM  
 ALT (SGPT) 12 02/06/2019 08:31 AM  
 AST (SGOT) 17 02/06/2019 08:31 AM  
 Albumin 4.4 02/06/2019 08:31 AM  
 Prostate Specific Ag 2.6 07/11/2018 08:32 AM

## 2019-02-11 NOTE — ASSESSMENT & PLAN NOTE
Stable, based on history, physical exam and review of pertinent labs, studies and medications; meds reconciled; continue current treatment plan. Key CAD CHF Meds   
    
  
 carvedilol (COREG) 12.5 mg tablet  (Taking) Take 12.5 mg by mouth two (2) times daily (with meals). terazosin (HYTRIN) 2 mg capsule  (Taking) TAKE ONE CAPSULE BY MOUTH AT BEDTIME BENICAR 40 mg tablet  (Taking) Take 40 mg by mouth once over twenty-four (24) hours. Lab Results Component Value Date/Time  Sodium 142 02/06/2019 08:31 AM  
 Potassium 4.2 02/06/2019 08:31 AM  
 Cholesterol, total 162 02/06/2019 08:31 AM  
 HDL Cholesterol 41 02/06/2019 08:31 AM  
 LDL, calculated 106 02/06/2019 08:31 AM  
 Triglyceride 73 02/06/2019 08:31 AM

## 2019-02-11 NOTE — LETTER
2/11/2019 1:44 PM 
 
Mr. Rossi Brown [address] [place] [phone] Dear Rossi Brown: 
 
Please find your most recent results below. Results for orders placed or performed in visit on 02/04/19 CBC W/O DIFF Result Value Ref Range WBC 5.2 3.4 - 10.8 x10E3/uL  
 RBC 4.67 4.14 - 5.80 x10E6/uL HGB 14.0 13.0 - 17.7 g/dL HCT 41.8 37.5 - 51.0 % MCV 90 79 - 97 fL  
 MCH 30.0 26.6 - 33.0 pg  
 MCHC 33.5 31.5 - 35.7 g/dL  
 RDW 14.3 12.3 - 15.4 % PLATELET 951 811 - 659 x10E3/uL METABOLIC PANEL, COMPREHENSIVE Result Value Ref Range Glucose 94 65 - 99 mg/dL BUN 13 8 - 27 mg/dL Creatinine 0.94 0.76 - 1.27 mg/dL GFR est non-AA 80 >59 mL/min/1.73 GFR est AA 93 >59 mL/min/1.73  
 BUN/Creatinine ratio 14 10 - 24 Sodium 142 134 - 144 mmol/L Potassium 4.2 3.5 - 5.2 mmol/L Chloride 101 96 - 106 mmol/L  
 CO2 26 20 - 29 mmol/L Calcium 9.2 8.6 - 10.2 mg/dL Protein, total 6.9 6.0 - 8.5 g/dL Albumin 4.4 3.5 - 4.8 g/dL GLOBULIN, TOTAL 2.5 1.5 - 4.5 g/dL A-G Ratio 1.8 1.2 - 2.2 Bilirubin, total 0.9 0.0 - 1.2 mg/dL Alk. phosphatase 76 39 - 117 IU/L  
 AST (SGOT) 17 0 - 40 IU/L  
 ALT (SGPT) 12 0 - 44 IU/L  
LIPID PANEL Result Value Ref Range Cholesterol, total 162 100 - 199 mg/dL Triglyceride 73 0 - 149 mg/dL HDL Cholesterol 41 >39 mg/dL VLDL, calculated 15 5 - 40 mg/dL LDL, calculated 106 (H) 0 - 99 mg/dL Adopt a Mediterranean-style lifestyle: 1. Lots of fresh, locally-grown fruits and vegetables (aim for 7 or more servings a day). 2. Complex carbohydrates like whole grains, nuts, beans and bread (with at least 4 grams of fiber per serving). Avoid the whites  white flour, sugar, white pasta, white rice. 3. Minimally processed foods (5 or fewer listed ingredients on the label). 4. Olive oil as the primary source of fat. 5. Low to moderate amounts of dairy, fish and poultry. 6. Red meat rarely (grass-fed, organic when you do eat it). 7. Low amounts of wine with meals (optional). 8. Unhurried meals with loved ones. 9. Daily exercise (30  60 minutes). 10.  
Resources:  
FindPure.gl  
http://www.directworx/health/mediterranean-diet/NM63878 Cookbooks:  
Mediterranean [place] by Christiano Evans The Best of Recipes for Health by Christiano Evans [employer] by Rakesh Moy The Food You Crave by Rosa Holden So Easy by Rosa Holden Please limit the amount of sodium (salt) in your diet. You should be getting no more than 2300 mg of sodium/salt per day. Remember, if you eat anything that is prepared or comes out of a box, a bag or a can, it has salt in it! Please read labels! Please call me if you have any questions: [phone] Sincerely, Adrián Tai MD

## 2019-02-13 DIAGNOSIS — I10 ESSENTIAL HYPERTENSION: ICD-10-CM

## 2019-02-13 RX ORDER — CARVEDILOL 12.5 MG/1
TABLET ORAL
Qty: 180 TAB | Refills: 0 | Status: SHIPPED | OUTPATIENT
Start: 2019-02-13 | End: 2019-05-20 | Stop reason: SDUPTHER

## 2019-03-25 ENCOUNTER — OFFICE VISIT (OUTPATIENT)
Dept: FAMILY MEDICINE CLINIC | Age: 74
End: 2019-03-25

## 2019-03-25 VITALS
TEMPERATURE: 97.9 F | HEART RATE: 59 BPM | DIASTOLIC BLOOD PRESSURE: 90 MMHG | RESPIRATION RATE: 18 BRPM | HEIGHT: 73 IN | WEIGHT: 221.4 LBS | BODY MASS INDEX: 29.34 KG/M2 | OXYGEN SATURATION: 98 % | SYSTOLIC BLOOD PRESSURE: 140 MMHG

## 2019-03-25 DIAGNOSIS — I10 ESSENTIAL HYPERTENSION: Primary | ICD-10-CM

## 2019-03-25 RX ORDER — IRBESARTAN 300 MG/1
TABLET ORAL
COMMUNITY
Start: 2019-03-20 | End: 2019-03-25 | Stop reason: SDUPTHER

## 2019-03-25 RX ORDER — IRBESARTAN 300 MG/1
300 TABLET ORAL DAILY
Qty: 90 TAB | Refills: 1 | Status: SHIPPED | OUTPATIENT
Start: 2019-03-25 | End: 2019-06-23

## 2019-03-25 NOTE — PROGRESS NOTES
Subjective:  
 
Rosalind Lynn is a 76 y.o. male who presents for follow up of hypertension. I recall we started him on Benicar, which he had been taking. However, the cost of the medication is too much. Therefore, he had a discussion with his pharmacist who suggested Irbesartan. We will refill with this. He is still seeing some higher numbers for his BP at home. But, often, it is in the 130/80-90 range. Diet and Lifestyle: generally follows a low fat low cholesterol diet, generally follows a low sodium diet Home BP Monitoring: is borderline controlled at home, ranging 130-170's/80-90's 
 
Cardiovascular ROS: taking medications as instructed, no medication side effects noted, no TIA's, no chest pain on exertion, no dyspnea on exertion, no swelling of ankles. New concerns: As in the HPI Patient Active Problem List  
 Diagnosis Date Noted  Paroxysmal atrial fibrillation ([facility] [address] 07/12/2018  Melanoma of left upper arm ([facility] [address] 08/23/2016  Elevated TSH 06/02/2016 Current Outpatient Medications Medication Sig Dispense Refill  irbesartan (AVAPRO) 300 mg tablet Take 1 Tab by mouth daily for 90 days. 90 Tab 1  carvedilol (COREG) 12.5 mg tablet TAKE 1 TABLET BY MOUTH TWICE A DAY WITH MEALS 180 Tab 0  
 terazosin (HYTRIN) 2 mg capsule TAKE ONE CAPSULE BY MOUTH AT BEDTIME 90 Cap 0 Allergies Allergen Reactions  Codeine Other (comments) Bad dreams Past Medical History:  
Diagnosis Date  Family history of skin cancer   
 brother-melanoma  Hyperlipidemia  Hypertension  Sun-damaged skin Past Surgical History:  
Procedure Laterality Date  COLONOSCOPY N/A 2/4/2019 COLONOSCOPY performed by Tavares Vasquez MD at [address]    
 at 10years old Family History Problem Relation Age of Onset  Other Mother   
     hypotension  Heart Failure Father  Cancer Father Prostate  Other Father PUD  Heart Disease Father Heart Valve issue and replacement  Cancer Brother Melanoma -  at 39yrs old  Cancer Sister Hodgkin's in one, melanoma in other  Cancer Paternal Uncle Prostate  Cancer Maternal Grandfather Lung Social History Tobacco Use  Smoking status: Never Smoker  Smokeless tobacco: Never Used Substance Use Topics  Alcohol use: No  
  Alcohol/week: 0.0 oz Review of Systems, additional: 
Pertinent items are noted in HPI. Objective:  
 
/90   Pulse (!) 59   Temp 97.9 °F (36.6 °C) (Oral)   Resp 18   Ht 6' 1\" (1.854 m)   Wt 221 lb 6.4 oz (100.4 kg)   SpO2 98%   BMI 29.21 kg/m² Appearance: alert, well appearing, and in no distress. General exam: CVS exam BP noted to be mildly elevated today in office, S1, S2 normal, no gallop, no murmur, chest clear, no JVD, no HSM, no edema, peripheral vascular exam both carotids normal upstroke without bruits, neurological exam alert, oriented, normal speech, no focal findings or movement disorder noted, neck supple without rigidity. Lab review: no lab studies available for review at time of visit. Assessment/Plan:  
Diagnoses and all orders for this visit: 1. Essential hypertension Other orders 
-     irbesartan (AVAPRO) 300 mg tablet; Take 1 Tab by mouth daily for 90 days. Hypertension improved. reviewed diet, exercise and weight control 
recommended sodium restriction 
the following changes are made - we will change Benicar for Irbesartan 
use of Niacin reviewed. I have discussed the diagnosis with the patient and the intended treatment plan as seen in the above orders. The patient has received an after-visit summary and questions were answered concerning future plans. Asked to return should symptoms worsen or not improve with treatment. Any pending labs and studies will be relayed to patient when they become available. Pt verbalizes understanding of plan of care and denies further questions or concerns at this time. Follow-up and Dispositions · Return in about 2 months (around 5/25/2019), or if symptoms worsen or fail to improve, for Recheck BP. Naomi Moritz, MD 
 
Patient Instructions Learning About the [address] Diet What is the Mediterranean diet? The Mediterranean diet is a style of eating rather than a diet plan. It features foods eaten in [place], [place], [place], and other countries along the [facility]. It emphasizes eating foods like fish, fruits, vegetables, beans, high-fiber breads and whole grains, nuts, and olive oil. This style of eating includes limited red meat, cheese, and sweets. Why choose the Mediterranean diet? A Mediterranean-style diet may improve heart health. It contains more fat than other heart-healthy diets. But the fats are mainly from nuts, unsaturated oils (such as fish oils and olive oil), and certain nut or seed oils (such as canola, soybean, or flaxseed oil). These fats may help protect the heart and blood vessels. How can you get started on the Mediterranean diet? Here are some things you can do to switch to a more Mediterranean way of eating. What to eat · Eat a variety of fruits and vegetables each day, such as grapes, blueberries, tomatoes, broccoli, peppers, figs, olives, spinach, eggplant, beans, lentils, and chickpeas. · Eat a variety of whole-grain foods each day, such as oats, brown rice, and whole wheat bread, pasta, and couscous. · Eat fish at least 2 times a week. Try tuna, salmon, mackerel, lake trout, herring, or sardines. · Eat moderate amounts of low-fat dairy products, such as milk, cheese, or yogurt. · Eat moderate amounts of poultry and eggs. · Choose healthy (unsaturated) fats, such as nuts, olive oil, and certain nut or seed oils like canola, soybean, and flaxseed. · Limit unhealthy (saturated) fats, such as butter, palm oil, and coconut oil. And limit fats found in animal products, such as meat and dairy products made with whole milk. Try to eat red meat only a few times a month in very small amounts. · Limit sweets and desserts to only a few times a week. This includes sugar-sweetened drinks like soda. The Mediterranean diet may also include red wine with your meal1 glass each day for women and up to 2 glasses a day for men. Tips for eating at home · Use herbs, spices, garlic, lemon zest, and citrus juice instead of salt to add flavor to foods. · Add avocado slices to your sandwich instead of painter. · Have fish for lunch or dinner instead of red meat. Brush the fish with olive oil, and broil or grill it. · Sprinkle your salad with seeds or nuts instead of cheese. · Cook with olive or canola oil instead of butter or oils that are high in saturated fat. · Switch from 2% milk or whole milk to 1% or fat-free milk. · Dip raw vegetables in a vinaigrette dressing or hummus instead of dips made from mayonnaise or sour cream. 
· Have a piece of fruit for dessert instead of a piece of cake. Try baked apples, or have some dried fruit. Tips for eating out · Try broiled, grilled, baked, or poached fish instead of having it fried or breaded. · Ask your  to have your meals prepared with olive oil instead of butter. · Order dishes made with marinara sauce or sauces made from olive oil. Avoid sauces made from cream or mayonnaise. · Choose whole-grain breads, whole wheat pasta and pizza crust, brown rice, beans, and lentils. · Cut back on butter or margarine on bread. Instead, you can dip your bread in a small amount of olive oil. · Ask for a side salad or grilled vegetables instead of french fries or chips. Where can you learn more? Go to http://lis.info/.  
Enter O407 in the search box to learn more about \"Learning About the Mediterranean Diet. \" Current as of: March 28, 2018 Content Version: 11.9 © 3719-1412 [employer]. Care instructions adapted under license by [employer] (which disclaims liability or warranty for this information). If you have questions about a medical condition or this instruction, always ask your healthcare professional. [address] any warranty or liability for your use of this information.

## 2019-03-25 NOTE — PROGRESS NOTES
All health maintenance and other pertinent information has been reviewed in preparation for today's office visit. Patient presents in the office today for: Chief Complaint Patient presents with  Follow-up Htn, A-fib, Elevated TSH 1. Have you been to the ER, urgent care clinic since your last visit? Hospitalized since your last visit? No 
 
2. Have you seen or consulted any other health care providers outside of the [address] [place] [address] since your last visit? Include any pap smears or colon screening. No 
 
 
Wt Readings from Last 3 Encounters:  
03/25/19 221 lb 6.4 oz (100.4 kg) 02/11/19 221 lb (100.2 kg) 02/04/19 213 lb (96.6 kg) Temp Readings from Last 3 Encounters:  
03/25/19 97.9 °F (36.6 °C) (Oral) 02/11/19 97.7 °F (36.5 °C) (Oral) 02/04/19 97.9 °F (36.6 °C) BP Readings from Last 3 Encounters:  
03/25/19 (!) 178/95  
02/11/19 156/90  
02/04/19 (!) 164/94 Pulse Readings from Last 3 Encounters:  
03/25/19 (!) 59  
02/11/19 67  
02/04/19 66

## 2019-03-25 NOTE — PATIENT INSTRUCTIONS
Learning About the [address] [facility] Diet What is the Mediterranean diet? The Mediterranean diet is a style of eating rather than a diet plan. It features foods eaten in [place], [place], [place], and other countries along the [facility]. It emphasizes eating foods like fish, fruits, vegetables, beans, high-fiber breads and whole grains, nuts, and olive oil. This style of eating includes limited red meat, cheese, and sweets. Why choose the Mediterranean diet? A Mediterranean-style diet may improve heart health. It contains more fat than other heart-healthy diets. But the fats are mainly from nuts, unsaturated oils (such as fish oils and olive oil), and certain nut or seed oils (such as canola, soybean, or flaxseed oil). These fats may help protect the heart and blood vessels. How can you get started on the Mediterranean diet? Here are some things you can do to switch to a more Mediterranean way of eating. What to eat · Eat a variety of fruits and vegetables each day, such as grapes, blueberries, tomatoes, broccoli, peppers, figs, olives, spinach, eggplant, beans, lentils, and chickpeas. · Eat a variety of whole-grain foods each day, such as oats, brown rice, and whole wheat bread, pasta, and couscous. · Eat fish at least 2 times a week. Try tuna, salmon, mackerel, lake trout, herring, or sardines. · Eat moderate amounts of low-fat dairy products, such as milk, cheese, or yogurt. · Eat moderate amounts of poultry and eggs. · Choose healthy (unsaturated) fats, such as nuts, olive oil, and certain nut or seed oils like canola, soybean, and flaxseed. · Limit unhealthy (saturated) fats, such as butter, palm oil, and coconut oil. And limit fats found in animal products, such as meat and dairy products made with whole milk. Try to eat red meat only a few times a month in very small amounts. · Limit sweets and desserts to only a few times a week.  This includes sugar-sweetened drinks like soda. The Mediterranean diet may also include red wine with your meal1 glass each day for women and up to 2 glasses a day for men. Tips for eating at home · Use herbs, spices, garlic, lemon zest, and citrus juice instead of salt to add flavor to foods. · Add avocado slices to your sandwich instead of painter. · Have fish for lunch or dinner instead of red meat. Brush the fish with olive oil, and broil or grill it. · Sprinkle your salad with seeds or nuts instead of cheese. · Cook with olive or canola oil instead of butter or oils that are high in saturated fat. · Switch from 2% milk or whole milk to 1% or fat-free milk. · Dip raw vegetables in a vinaigrette dressing or hummus instead of dips made from mayonnaise or sour cream. 
· Have a piece of fruit for dessert instead of a piece of cake. Try baked apples, or have some dried fruit. Tips for eating out · Try broiled, grilled, baked, or poached fish instead of having it fried or breaded. · Ask your  to have your meals prepared with olive oil instead of butter. · Order dishes made with marinara sauce or sauces made from olive oil. Avoid sauces made from cream or mayonnaise. · Choose whole-grain breads, whole wheat pasta and pizza crust, brown rice, beans, and lentils. · Cut back on butter or margarine on bread. Instead, you can dip your bread in a small amount of olive oil. · Ask for a side salad or grilled vegetables instead of french fries or chips. Where can you learn more? Go to http://allie-valdemar.info/. Enter 766-948-0783 in the search box to learn more about \"Learning About the Mediterranean Diet. \" Current as of: March 28, 2018 Content Version: 11.9 © 7150-5406 [employer]. Care instructions adapted under license by [employer] (which disclaims liability or warranty for this information).  If you have questions about a medical condition or this instruction, always ask your healthcare professional. [place] [address] any warranty or liability for your use of this information.

## 2019-04-09 DIAGNOSIS — I10 ESSENTIAL HYPERTENSION, BENIGN: ICD-10-CM

## 2019-04-09 RX ORDER — TERAZOSIN 2 MG/1
CAPSULE ORAL
Qty: 90 CAP | Refills: 0 | Status: SHIPPED | OUTPATIENT
Start: 2019-04-09 | End: 2019-07-16 | Stop reason: SDUPTHER

## 2019-04-10 ENCOUNTER — HOSPITAL ENCOUNTER (OUTPATIENT)
Dept: LAB | Age: 74
Discharge: HOME | End: 2019-04-10

## 2019-04-10 ENCOUNTER — OFFICE VISIT (OUTPATIENT)
Dept: DERMATOLOGY | Facility: AMBULATORY SURGERY CENTER | Age: 74
End: 2019-04-10

## 2019-04-10 VITALS
SYSTOLIC BLOOD PRESSURE: 160 MMHG | RESPIRATION RATE: 16 BRPM | WEIGHT: 221 LBS | DIASTOLIC BLOOD PRESSURE: 80 MMHG | BODY MASS INDEX: 29.29 KG/M2 | TEMPERATURE: 97.5 F | HEART RATE: 65 BPM | HEIGHT: 73 IN | OXYGEN SATURATION: 98 %

## 2019-04-10 DIAGNOSIS — D48.5 NEOPLASM OF UNCERTAIN BEHAVIOR OF SKIN OF BACK: Primary | ICD-10-CM

## 2019-04-10 DIAGNOSIS — D18.01 CHERRY ANGIOMA: ICD-10-CM

## 2019-04-10 DIAGNOSIS — L82.1 SEBORRHEIC KERATOSES: ICD-10-CM

## 2019-04-10 DIAGNOSIS — Z85.820 PERSONAL HISTORY OF MALIGNANT MELANOMA OF SKIN: ICD-10-CM

## 2019-04-10 DIAGNOSIS — D22.9 MULTIPLE BENIGN NEVI: ICD-10-CM

## 2019-04-10 DIAGNOSIS — D23.9 DERMATOFIBROMA: ICD-10-CM

## 2019-04-10 DIAGNOSIS — L81.4 LENTIGINES: ICD-10-CM

## 2019-04-10 PROCEDURE — 88341 IMHCHEM/IMCYTCHM EA ADD ANTB: CPT

## 2019-04-10 NOTE — PATIENT INSTRUCTIONS
Chief Complaint   Patient presents with    Skin Exam     4 month exam.  Areas of concern: rough mole on mid-abdomen     Self Skin Exam and Sunscreens    Early detection and treatment is essential in the treatment of all forms of skin cancer. If caught early, all forms of skin cancer are curable. In addition to your regular visits, you should perform a monthly skin examination. Over time, you become familiar with what is normally found on your skin and can identify new or suspicious spots. One of the screening tools you can use to assess your skin is to follow the ABCDEs:    A= Asymmetry (One half is unlike the other half)     B= Border (An irregular, scalloped or poorly defined edge)    C= Color (Is varied from one area to another, has shades of tan, brown/ black, white, red or blue)    D= Diameter (Spots larger than 6mm or a pencil eraser)    E= Evolving (New spots or one that is changing in size, shape, or color)    A follow- up interval will be customized based on your history of skin cancer or level of skin damage and risk factors. In any case, if you notice a suspicious or new spot, an appointment should be arranged between regular visits. Everyone should use sunscreen and sun-safe practices, which is especially important for those with a personal or family history of skin cancer. Suggestions for this include:    1. Use daily moisturizers containing SPF 30 or higher. 2. Wear long sleeve clothing with UPF ratings and a broad-brimmed hat. 3. Apply sunscreen with SPF 30 or higher to all sun exposed areas if you are going to be in the sun. A broad spectrum UVA/ UVB sunscreen is best.  Dont forget to REAPPLY every two hours or more often if swimming or sweating! 4. Avoid outside activities during peak sun hours, especially in the summer (10am- 2pm). 5. DO NOT use tanning beds.     Using sunscreen and sun-safe practices can help reduce the likelihood of developing skin cancer or additional skin cancers in those previously diagnosed.

## 2019-04-10 NOTE — PROGRESS NOTES
Written by Dia Frias, as dictated by Liberty Wright, [address]. Name: Rolando Berger       Age: 76 y.o. Date: 4/10/2019    Chief Complaint:   Chief Complaint   Patient presents with    Skin Exam     4 month exam.  Areas of concern: rough mole on mid-abdomen       Subjective:    HPI  Mr. Rolando Berger is a 76 y.o. male who presents for a full skin exam.  The patient's last skin exam was on 11/29/18 and the patient does have current complaints related to his skin. He report a new rough lesion on the right abdomen. He is feeling well and in his usual state of health today. He has no current illnesses, no other skin concerns. His allergies, medications, medical, and social history are reviewed by me today. The patient's pertinent skin history includes : personal history of melanoma  -MM, left upper arm, stage 1a, Breslow depth 0.55 mm, Felix's level II-III, excision, 08/23/16    ROS: Constitutional: Negative.     Dermatological : positive for - skin lesion changes    Social History     Socioeconomic History    Marital status:      Spouse name: Not on file    Number of children: Not on file    Years of education: Not on file    Highest education level: Not on file   Occupational History    Not on file   Social Needs    Financial resource strain: Not on file    Food insecurity:     Worry: Not on file     Inability: Not on file    Transportation needs:     Medical: Not on file     Non-medical: Not on file   Tobacco Use    Smoking status: Never Smoker    Smokeless tobacco: Never Used   Substance and Sexual Activity    Alcohol use: No     Alcohol/week: 0.0 oz    Drug use: No    Sexual activity: Never   Lifestyle    Physical activity:     Days per week: Not on file     Minutes per session: Not on file    Stress: Not on file   Relationships    Social connections:     Talks on phone: Not on file     Gets together: Not on file     Attends Shinto service: Not on file Active member of club or organization: Not on file     Attends meetings of clubs or organizations: Not on file     Relationship status: Not on file    Intimate partner violence:     Fear of current or ex partner: Not on file     Emotionally abused: Not on file     Physically abused: Not on file     Forced sexual activity: Not on file   Other Topics Concern    Not on file   Social History Narrative    Not on file       Family History   Problem Relation Age of Onset    Other Mother         hypotension    Heart Failure Father     Cancer Father         Prostate    Other Father         PUD    Heart Disease Father         Heart Valve issue and replacement    Cancer Brother         Melanoma -  at 39yrs old   [facility] Cancer Sister         Hodgkin's in one, melanoma in other    Cancer Paternal Uncle         Prostate    Cancer Maternal Grandfather         Lung       Past Medical History:   Diagnosis Date    Family history of skin cancer     brother-melanoma    Hyperlipidemia     Hypertension     Sun-damaged skin        Past Surgical History:   Procedure Laterality Date    COLONOSCOPY N/A 2019    COLON[facility] Fuad [facility]lesby [facility] at [facility] ENDOSCOPY    HX TONSILLECTOMY      at 10years old       Current Outpatient Medications   Medication Sig Dispense Refill    terazosin (HYTRIN) 2 mg capsule TAKE 1 CAPSULE BY MOUTH EVERYDAY AT BEDTIME 90 Cap 0    irbesartan (AVAPRO) 300 mg tablet Take 1 Tab by mouth daily for 90 days. 90 Tab 1    carvedilol (COREG) 12.5 mg tablet TAKE 1 TABLET BY MOUTH TWICE A DAY WITH MEALS 180 Tab 0       Allergies   Allergen Reactions    Codeine Other (comments)     Bad dreams         Objective:    Visit Vitals  /80 (BP 1 Location: Left arm, BP Patient Position: Sitting)   Pulse 65   Temp 97.5 °F (36.4 °C) (Oral)   Resp 16   Ht 6' 1\" (1.854 m)   Wt 221 lb (100.2 kg)   SpO2 98%   BMI 29.16 kg/m²       Pernell Watts is a 76 y.o. male who appears well and in no distress.   He is awake, alert, and oriented. There is no preauricular, submandibular, or cervical lymphadenopathy. A skin examination was performed including his scalp, face (including eyelids), ears, neck, chest, back, abdomen, upper extremities (including digits/nails), lower extremities, breasts, buttocks; genital skin was not examined. There is a well-healed melanoma scar on the left upper arm without pigment, nodularity ,or other evidence of lesion recurrence. He has scattered waxy macules and keratotic papules consistent with seborrheic keratoses, including the lesion of concern on the right abdomen. He has pink intradermal nevi and brown junctional nevi- some larger congenital appearing nevi. There are lentigines on sun exposed areas. He has has scattered red papules consistent with cherry angiomas. He has firm pink and brown papules on the left upper arm and right thigh x2 consistent with dermatofibroma. Mid back          Mid back               Mid back- scope    Assessment/Plan:    1. Personal history of melanoma skin cancer- stage 1a. I discussed sun protection, sunscreen use, the warning signs of skin cancer, the need for self-skin examinations, and the need for regular practitioner exams every 4 months. The patient should follow up sooner as needed if new, changing, or symptomatic skin lesions arise. 2. Neoplasm of Uncertain Behavior, mid back, R/O atypical pigmented lesion. The differential diagnoses were discussed. A shave biopsy was advised to sample this lesion. The procedure was reviewed and verbal and written consent were obtained. The risks of pain, bleeding, infection, and scar were discussed. The patient is aware that this is a sample and is intended for diagnosis and not therapy of the skin lesion. I performed the procedure. The site was cleansed and anesthetized with 1% Lidocaine with Epinephrine 1:100,000. A shave biopsy was performed to sample the lesion.   Drysol was used for hemostasis. The wound was bandaged and care reviewed. The specimen was sent to pathology. I will contact the patient with the results and any further treatment that may be necessary. 3. Seborrheic keratoses. The diagnosis was reviewed and the patient was reassured that no treatment is needed for these benign lesions. 4. Normal nevi. The diagnosis of normal nevi was reviewed. I discussed sun protection, sunscreen use, the warning signs of skin cancer, the need for self-skin examinations, and the need for regular practitioner exams every 4 months. The patient should follow up sooner as needed if new, changing, or symptomatic skin lesions arise. 5. Solar lentigos. The diagnosis and relationship to sun exposure was reviewed. Sun protection advised. 6. Cherry angiomas. The diagnosis was reviewed and the patient was reassured that no treatment is needed for these benign lesions. 7. Dermatofibromas. The diagnosis was reviewed and the patient was reassured that no treatment is needed for these benign conditions at this time. The patient should follow up should the lesion change or become symptomatic. This plan was reviewed with the patient and patient agrees. All questions were answered. This scribe documentation was reviewed by me and accurately reflects the examination and decisions made by me. [facility]   OFFICE PROCEDURE PROGRESS NOTE   Chart reviewed for the following:   IWaldo, have reviewed the History, Physical and updated the Allergic reactions for Adán Villalta. TIME OUT performed immediately prior to start of procedure:   IFeli, [address], have performed the following reviews on Adán Villalta   prior to the start of the procedure:     * Patient was identified by name and date of birth   * Agreement on procedure being performed was verified   * Risks and Benefits explained to the patient   * Procedure site verified and marked as necessary   * Patient was positioned for comfort   * Consent was signed and verified     Time: 2:45 PM  Date of procedure: 4/10/2019  Procedure performed by: Js Robledo  Provider assisted by: Karla Shetty RN   Patient assisted by: self   How tolerated by patient: tolerated the procedure well with no complications   Comments: none

## 2019-04-15 ENCOUNTER — OFFICE VISIT (OUTPATIENT)
Dept: DERMATOLOGY | Facility: AMBULATORY SURGERY CENTER | Age: 74
End: 2019-04-15

## 2019-04-15 ENCOUNTER — HOSPITAL ENCOUNTER (OUTPATIENT)
Dept: LAB | Age: 74
Discharge: HOME | End: 2019-04-15

## 2019-04-15 VITALS
SYSTOLIC BLOOD PRESSURE: 138 MMHG | TEMPERATURE: 98 F | HEART RATE: 70 BPM | OXYGEN SATURATION: 97 % | BODY MASS INDEX: 29.29 KG/M2 | DIASTOLIC BLOOD PRESSURE: 84 MMHG | WEIGHT: 221 LBS | RESPIRATION RATE: 14 BRPM | HEIGHT: 73 IN

## 2019-04-15 DIAGNOSIS — C43.59 MALIGNANT MELANOMA OF UPPER BACK (HCC): Primary | ICD-10-CM

## 2019-04-15 NOTE — PATIENT INSTRUCTIONS
WOUND CARE INSTRUCTIONS    1. Keep the dressing clean and dry and do not remove for 48 hours. 2. Then change the dressing once a day as follows:  a. Wash hands before and after each dressing change. b. Remove dressing and wash site gently with mild soap and water, rinse, and pat dry.  c. Apply an ointment (Bacitracin, Polysporin, Neosporin, Petroleum jelly or Aquaphor). d. Apply a non-stick (Telfa) dressing or Band-Aid to cover the wound. 3. Watch for:  BLEEDING: A small amount of drainage may occur. If bleeding occurs, elevate and rest the surgery site. Apply gauze and steady pressure for 15 minutes. If bleeding continues, call this office. INFECTION: Signs of infection include increased redness, pain, warmth, drainage of pus, and fever. If this occurs, call this office. 4. Special Instructions (follow any that are checked):  · [x] You have stitches that DO need to be removed. · [x] Avoid bending at the waist and heavy lifting for two days. · [] Sleep with your head elevated for the next two nights. · [x] Rest the surgery site and keep it elevated as much as possible for two days. · [x] You may apply an ice-pack for 10-15 minutes every waking hour for the rest of the day. · [] Eat a soft diet and avoid hot food and hot drinks for the rest of the day. · [] Other instructions: Follow up as directed. Take Tylenol or Ibuprofen for pain as needed. Once the site is healed with no remaining bandages or open areas, protect your surgical site and scar from the sun, as this area will be more sensitive. Use a broad spectrum sunscreen SPF 30 or higher daily, and a chemical free product (one containing zinc oxide or titanium dioxide) is a good choice if the area is sensitive. You may begin to gently massage the surgical site in 2-3 weeks, rubbing in a circular motion along the scar. This can help reduce swelling and thickness of a scar.  A scar cream may be used beginnning 1 month after the surgery. If you have any questions or concerns, please call our office Monday through Friday at [phone]. You can also contact Dr. Coby Whipple directly for emergency purposes at [phone].

## 2019-04-15 NOTE — PROGRESS NOTES
I spoke with the patient and he is doing well - he understands the diagnosis of Melanoma again is noted. He is leaving for [place] in 2 days. He will come in today for excision with Dr. Arash Lindsay at 1:15.

## 2019-04-15 NOTE — PROGRESS NOTES
Wing Donohue is a 76 y.o. male presents to the office today with the following:  Chief Complaint   Patient presents with    Other     excision on mid back-melanoma       72-year-old  male presents for surgical excision of biopsy-proven malignant melanoma, lentigo maligna melanoma type, that is 0.58 mm thick on the mid back. It was recently biopsied by Ortega Stroud NP. The patient has had no problems with the biopsy site since that time. He does have a personal history of melanoma. He has no additional complaints today and is otherwise in his normal state of good health. He plans to travel to [place] in 2 days and will be gone for 5 weeks. Physical Exam   Constitutional: He is oriented to person, place, and time and well-developed, well-nourished, and in no distress. HENT:   Head: Normocephalic. Eyes: EOM are normal.   Pulmonary/Chest: Effort normal.   Neurological: He is alert and oriented to person, place, and time. Skin:   On the mid back there is a crusted ulcer with no apparent pigment either clinically or on Woods lamp exam.       1. Malignant melanoma of upper back ([address]  Excision was advised for removal and therapy of this 1.4 cm bx-proven MM on the mid back. The excision procedure was reviewed and verbal and written consents were obtained. The risks of pain, bleeding, infection, recurrence of the lesion, and scar were discussed. I performed the procedure. The site was cleansed and anesthetized with 1% lidocaine with epinephrine 1:100,000. The lesion was excised with a 10 mm margin in an elliptical manner to a depth of subcutaneous tissue. A intermediate primary closure was performed after the excision using 3-0 PDS buried vertical mattress sutures and 3-0 ethilon epidermal sutures. The closure length was 9.4 cm. The wound was bandaged and care reviewed. The specimen was sent to pathology.   I will contact the patient with the results and any further treatment that may be necessary. Follow-up and Dispositions    · Return in about 2 months (around 6/15/2019) for Wound check.          Marsha Gagnon MD

## 2019-04-17 ENCOUNTER — TELEPHONE (OUTPATIENT)
Dept: DERMATOLOGY | Facility: AMBULATORY SURGERY CENTER | Age: 74
End: 2019-04-17

## 2019-04-17 NOTE — TELEPHONE ENCOUNTER
----- Message from Charla Verduzco MD sent at 4/17/2019  3:36 PM EDT -----  Please contact the patient to let them know that the pathology from his excision has returned. His melanoma was completely removed and needs no further treatment at this time. Thank you.

## 2019-04-17 NOTE — PROGRESS NOTES
Please contact the patient to let them know that the pathology from his excision has returned. His melanoma was completely removed and needs no further treatment at this time. Thank you.

## 2019-04-18 NOTE — TELEPHONE ENCOUNTER
----- Message from Dale Banks MD sent at 4/17/2019  3:36 PM EDT -----  Please contact the patient to let them know that the pathology from his excision has returned. His melanoma was completely removed and needs no further treatment at this time. Thank you.

## 2019-04-18 NOTE — TELEPHONE ENCOUNTER
11:30 AM  RN left voicemail for patient to return call to [facility]. No details were left in message.

## 2019-04-19 NOTE — TELEPHONE ENCOUNTER
----- Message from Lisbeth Mercado MD sent at 4/17/2019  3:36 PM EDT -----  Please contact the patient to let them know that the pathology from his excision has returned. His melanoma was completely removed and needs no further treatment at this time. Thank you. - - -

## 2019-04-22 NOTE — TELEPHONE ENCOUNTER
Informed patient that his melanoma was completely excised and needs no further treatment. Patient verbalized understanding.

## 2019-04-22 NOTE — TELEPHONE ENCOUNTER
----- Message from Mauri Dumont MD sent at 4/17/2019  3:36 PM EDT -----  Please contact the patient to let them know that the pathology from his excision has returned. His melanoma was completely removed and needs no further treatment at this time. Thank you.

## 2019-05-20 DIAGNOSIS — I10 ESSENTIAL HYPERTENSION: ICD-10-CM

## 2019-05-20 RX ORDER — CARVEDILOL 12.5 MG/1
TABLET ORAL
Qty: 180 TAB | Refills: 0 | Status: SHIPPED | OUTPATIENT
Start: 2019-05-20 | End: 2019-08-26 | Stop reason: SDUPTHER

## 2019-05-23 ENCOUNTER — OFFICE VISIT (OUTPATIENT)
Dept: FAMILY MEDICINE CLINIC | Age: 74
End: 2019-05-23

## 2019-05-23 VITALS
BODY MASS INDEX: 29.58 KG/M2 | DIASTOLIC BLOOD PRESSURE: 72 MMHG | RESPIRATION RATE: 20 BRPM | OXYGEN SATURATION: 97 % | HEIGHT: 73 IN | SYSTOLIC BLOOD PRESSURE: 138 MMHG | WEIGHT: 223.2 LBS | HEART RATE: 61 BPM | TEMPERATURE: 97.6 F

## 2019-05-23 DIAGNOSIS — H91.93 BILATERAL HEARING LOSS, UNSPECIFIED HEARING LOSS TYPE: ICD-10-CM

## 2019-05-23 DIAGNOSIS — I10 ESSENTIAL HYPERTENSION: Primary | ICD-10-CM

## 2019-05-23 NOTE — PROGRESS NOTES
Patient presents for 2 month check up for hypertension and a check on area to back that a cancerous lesion was removed.

## 2019-05-23 NOTE — PROGRESS NOTES
Chief Complaint   Patient presents with    Follow Up Chronic Condition     Follow up on hypertension    Neoplasm Of The Skin     S/P removal of a cancerous lesion to back       Subjective:     Lucille Day is a 76 y.o. male who presents for follow up of hypertension. Diet and Lifestyle: generally follows a low fat low cholesterol diet, generally follows a low sodium diet, exercises regularly  Home BP Monitoring: is not measured at home    Cardiovascular ROS: taking medications as instructed, no medication side effects noted, no TIA's, no chest pain on exertion, no dyspnea on exertion, no swelling of ankles. New concerns:   Recently had dermatological removal of cancerous lesion on his back. Stitches were removed by his daughter 10 days ago. He has a follow up with dermatology in 2-3 months. Would like me to evaluate the incision. C/o ongoing hearing loss. Noticing progressive hearing loss in both of his ears. R>>L. No wax impaction. It seemed to worsen after recently swimming in the [place] a few weeks ago. Patient Active Problem List    Diagnosis Date Noted    Paroxysmal atrial fibrillation ([facility] 07/12/2018    Melanoma of left upper arm ([facility] [address] 08/23/2016    Elevated TSH 06/02/2016     Current Outpatient Medications   Medication Sig Dispense Refill    carvedilol (COREG) 12.5 mg tablet TAKE 1 TABLET BY MOUTH TWICE A DAY WITH MEALS 180 Tab 0    terazosin (HYTRIN) 2 mg capsule TAKE 1 CAPSULE BY MOUTH EVERYDAY AT BEDTIME 90 Cap 0    irbesartan (AVAPRO) 300 mg tablet Take 1 Tab by mouth daily for 90 days.  90 Tab 1     Allergies   Allergen Reactions    Codeine Other (comments)     Bad dreams     Past Medical History:   Diagnosis Date    Family history of skin cancer     brother-melanoma    Hyperlipidemia     Hypertension     Sun-damaged skin      Past Surgical History:   Procedure Laterality Date    COLONOSCOPY N/A 2/4/2019    COLON[facility] Pau Hinkle MD at [facility] ENDOSCOPY    HX TONSILLECTOMY      at 10years old     Family History   Problem Relation Age of Onset    Other Mother         hypotension    Heart Failure Father     Cancer Father         Prostate    Other Father         PUD    Heart Disease Father         Heart Valve issue and replacement    Cancer Brother         Melanoma -  at 39yrs old   [de-identified] Cancer Sister         Hodgkin's in one, melanoma in other    Cancer Paternal Uncle         Prostate    Cancer Maternal Grandfather         Lung     Social History     Tobacco Use    Smoking status: Never Smoker    Smokeless tobacco: Never Used   Substance Use Topics    Alcohol use: No     Alcohol/week: 0.0 oz         Review of Systems, additional:  Constitutional: negative  Ears, nose, mouth, throat, and face: positive for hearing loss    Objective:     Visit Vitals  /72   Pulse 61   Temp 97.6 °F (36.4 °C) (Oral)   Resp 20   Ht 6' 1\" (1.854 m)   Wt 223 lb 3.2 oz (101.2 kg)   SpO2 97%   BMI 29.45 kg/m²       Appearance: alert, well appearing, and in no distress and normal appearing weight. General exam: [facility] exam BP noted to be well controlled today in office, S1, S2 normal, no gallop, no murmur, chest clear, no JVD, no HSM, no edema, peripheral vascular exam both carotids normal upstroke without bruits, neurological exam alert, oriented, normal speech, no focal findings or movement disorder noted. Ears - bilateral TM's and external ear canals normal.  Skin - well healed surgical scar on back. Lab review: orders written for new lab studies as appropriate; see orders. Assessment/Plan:     74M who presents for follow up of HTN and now new c/o worsening hearing loss. Ear exam was normal. Will send for audiology evaluation. Diagnoses and all orders for this visit:    1. Essential hypertension   Continue with current BP medication. No changes.      2. Bilateral hearing loss, unspecified hearing loss type  -     REFERRAL TO AUDIOLOGY    I have discussed the diagnosis with the patient and the intended treatment plan as seen in the above orders. The patient has received an after-visit summary and questions were answered concerning future plans. Asked to return should symptoms worsen or not improve with treatment. Any pending labs and studies will be relayed to patient when they become available. Pt verbalizes understanding of plan of care and denies further questions or concerns at this time. More than 50% of the visit was spent in discussing and counseling about  various concerns about his hearing and assisting with an audiologist. The visit was >25 minutes, but less than 30.      Erica Muro MD

## 2019-07-15 ENCOUNTER — TELEPHONE (OUTPATIENT)
Dept: FAMILY MEDICINE CLINIC | Age: 74
End: 2019-07-15

## 2019-07-15 DIAGNOSIS — I10 ESSENTIAL HYPERTENSION, BENIGN: ICD-10-CM

## 2019-07-15 NOTE — TELEPHONE ENCOUNTER
Patient is currently taking Teraosin and is has no refills and he wanted to know if Dr. hCani Dougherty wants him to continue taking it. If so please call in script to [facility]. If not please call him at [phone].

## 2019-07-16 RX ORDER — TERAZOSIN 2 MG/1
CAPSULE ORAL
Qty: 90 CAP | Refills: 0 | Status: SHIPPED | OUTPATIENT
Start: 2019-07-16 | End: 2019-10-11 | Stop reason: SDUPTHER

## 2019-08-02 ENCOUNTER — OFFICE VISIT (OUTPATIENT)
Dept: FAMILY MEDICINE CLINIC | Age: 74
End: 2019-08-02

## 2019-08-02 VITALS
HEIGHT: 73 IN | DIASTOLIC BLOOD PRESSURE: 90 MMHG | WEIGHT: 229 LBS | TEMPERATURE: 97.9 F | SYSTOLIC BLOOD PRESSURE: 146 MMHG | HEART RATE: 62 BPM | BODY MASS INDEX: 30.35 KG/M2 | OXYGEN SATURATION: 97 % | RESPIRATION RATE: 20 BRPM

## 2019-08-02 DIAGNOSIS — Z13.220 SCREENING FOR HYPERLIPIDEMIA: ICD-10-CM

## 2019-08-02 DIAGNOSIS — I10 ESSENTIAL HYPERTENSION, BENIGN: ICD-10-CM

## 2019-08-02 DIAGNOSIS — I10 ESSENTIAL HYPERTENSION: ICD-10-CM

## 2019-08-02 DIAGNOSIS — Z00.00 MEDICARE ANNUAL WELLNESS VISIT, SUBSEQUENT: Primary | ICD-10-CM

## 2019-08-02 DIAGNOSIS — E55.9 VITAMIN D DEFICIENCY: ICD-10-CM

## 2019-08-02 DIAGNOSIS — I48.0 PAROXYSMAL ATRIAL FIBRILLATION (HCC): ICD-10-CM

## 2019-08-02 RX ORDER — LOSARTAN POTASSIUM 50 MG/1
TABLET ORAL
Refills: 3 | COMMUNITY
Start: 2019-07-03 | End: 2020-03-02 | Stop reason: DRUGHIGH

## 2019-08-02 NOTE — PROGRESS NOTES
CC:  Chief Complaint   Patient presents with    Follow Up Chronic Condition     3 month follow up Hypertension and side effects from new medication changed by cardiologist    Annual Wellness Visit     needs AWV today as well. Subjective:     Chava Bajwa is a 76 y.o. male who presents for follow up of hypertension, A.fib and CAD. He reports that he has noticed episodes of lightheadedness, palpitations and chest discomforts since starting Losartan. Had been on Irbesartan and was told by cardiologist a few weeks ago to change to Losartan. Mr. Elizondo has a h/o paroxysmal A.fib and transient cardiomyopathy in the past. This was followed carefully with ECHO starting in February 2013. He had a short course of Xarelto in 2014. He is followed by Dr. Sherman Roman at [facility]. In addition, his Cardiomyopathy EF was 25-30% and improved to 45-50% by ECHO in 2016 and then subsequently it has been in the normal range per Dr. Moraima Carmona. Mr. Iris Perez needs fasting labs today. He is not fasting however. I have obtained an EKG which showed mild LAE and LVH. However, we don't have previous EKG's to compare. I have sent a letter of the issues with today's visit to Dr. Ced Arnett office. Diet and Lifestyle: generally follows a low fat low cholesterol diet, not attempting to follow a low sodium diet  Home BP Monitoring: is not measured at home    Cardiovascular ROS: taking medications as instructed, no medication side effects noted, no TIA's, no chest pain on exertion, no dyspnea on exertion, no swelling of ankles. New concerns:   As above.      Patient Active Problem List    Diagnosis Date Noted    Paroxysmal atrial fibrillation ([facility] [address] 07/12/2018    Melanoma of left upper arm ([facility] [address] 08/23/2016    Elevated TSH 06/02/2016     Current Outpatient Medications   Medication Sig Dispense Refill    losartan (COZAAR) 50 mg tablet TAKE 1 TABLET BY MOUTH EVERY DAY  3    terazosin (HYTRIN) 2 mg capsule TAKE 1 CAPSULE BY MOUTH EVERYDAY AT BEDTIME 90 Cap 0    carvedilol (COREG) 12.5 mg tablet TAKE 1 TABLET BY MOUTH TWICE A DAY WITH MEALS 180 Tab 0     Allergies   Allergen Reactions    Codeine Other (comments)     Bad dreams     Past Medical History:   Diagnosis Date    Family history of skin cancer     brother-melanoma    Hyperlipidemia     Hypertension     Sun-damaged skin      Past Surgical History:   Procedure Laterality Date    COLONOSCOPY N/A 2019    COLONOSCOPY performed by Marie Salgado MD at [facility]COPY    HX TONSILLECTOMY      at 10years old     Family History   Problem Relation Age of Onset    Other Mother         hypotension    Heart Failure Father     Cancer Father         Prostate    Other Father         PUD    Heart Disease Father         Heart Valve issue and replacement    Cancer Brother         Melanoma -  at 39yrs old   Sinha Cancer Sister         Hodgkin's in one, melanoma in other   Sinha Cancer Paternal Uncle         Prostate    Cancer Maternal Grandfather         Lung     Social History     Tobacco Use    Smoking status: Never Smoker    Smokeless tobacco: Never Used   Substance Use Topics    Alcohol use: No     Alcohol/week: 0.0 standard drinks      Review of Systems, additional:  Pertinent items are noted in HPI. Objective:     Visit Vitals  /90 (BP 1 Location: Left arm, BP Patient Position: Sitting)   Pulse 62   Temp 97.9 °F (36.6 °C) (Oral)   Resp 20   Ht 6' 1\" (1.854 m)   Wt 229 lb (103.9 kg)   SpO2 97%   BMI 30.21 kg/m²     Appearance: alert, well appearing, and in no distress and overweight. General exam: CVS exam BP noted to be well controlled today in office, S1, S2 normal, no gallop, no murmur, chest clear, no JVD, no HSM, no edema, peripheral vascular exam both carotids normal upstroke without bruits, neurological exam alert, oriented, normal speech, no focal findings or movement disorder noted.   Lab review: orders written for new lab studies as appropriate; see orders. Assessment/Plan:     Diagnoses and all orders for this visit:    1. Essential hypertension  -     AMB POC EKG ROUTINE W/ 12 LEADS, INTER & REP  -     CBC WITH AUTOMATED DIFF  -     METABOLIC PANEL, COMPREHENSIVE    2. Paroxysmal atrial fibrillation (HCC)   Reviewed history and discussed with patient. Reviewed recent Cardiology note sent in 12/18. No updated note yet. Letter sent to Dr. Vikash Harvey with today's EKG    3. Screening for hyperlipidemia  -     LIPID PANEL    4. Vitamin D deficiency  -     VITAMIN D, 25 HYDROXY    I have discussed the diagnosis with the patient and the intended treatment plan as seen in the above orders. The patient has received an after-visit summary and questions were answered concerning future plans. Asked to return should symptoms worsen or not improve with treatment. Any pending labs and studies will be relayed to patient when they become available. Pt verbalizes understanding of plan of care and denies further questions or concerns at this time. More than 50% of this 45 minute visit was spent in counseling and reviewing with the patient cardiac history and contacting his cardiologist.       Lilli Will:  This is the Subsequent Medicare Annual Wellness Exam, performed 12 months or more after the Initial AWV or the last Subsequent AWV    I have reviewed the patient's medical history in detail and updated the computerized patient record.      History     Past Medical History:   Diagnosis Date    Family history of skin cancer     brother-melanoma    Hyperlipidemia     Hypertension     Sun-damaged skin       Past Surgical History:   Procedure Laterality Date    COLONOSCOPY N/A 2/4/2019    COLON[facility]    HX TONSILLECTOMY      at 10years old     Current Outpatient Medications   Medication Sig Dispense Refill    losartan (COZAAR) 50 mg tablet TAKE 1 TABLET BY MOUTH EVERY DAY  3    terazosin (HYTRIN) 2 mg capsule TAKE 1 CAPSULE BY MOUTH EVERYDAY AT BEDTIME 90 Cap 0    carvedilol (COREG) 12.5 mg tablet TAKE 1 TABLET BY MOUTH TWICE A DAY WITH MEALS 180 Tab 0     Allergies   Allergen Reactions    Codeine Other (comments)     Bad dreams     Family History   Problem Relation Age of Onset    Other Mother         hypotension    Heart Failure Father     Cancer Father         Prostate    Other Father         PUD    Heart Disease Father         Heart Valve issue and replacement    Cancer Brother         Melanoma -  at 39yrs old   Romayne Hoffman Cancer Sister         Hodgkin's in one, melanoma in other    Cancer Paternal Uncle         Prostate    Cancer Maternal Grandfather         Lung     Social History     Tobacco Use    Smoking status: Never Smoker    Smokeless tobacco: Never Used   Substance Use Topics    Alcohol use: No     Alcohol/week: 0.0 standard drinks     Patient Active Problem List   Diagnosis Code    Elevated TSH R79.89    Melanoma of left upper arm (HCC) C43.62    Paroxysmal atrial fibrillation (HCC) I48.0       Depression Risk Factor Screening:     3 most recent PHQ Screens 2019   Little interest or pleasure in doing things Not at all   Feeling down, depressed, irritable, or hopeless Not at all   Total Score PHQ 2 0     Alcohol Risk Factor Screening: You do not drink alcohol or very rarely. Functional Ability and Level of Safety:   Hearing Loss  Hearing is good. Activities of Daily Living  The home contains: no safety equipment. Patient does total self care    Fall Risk  Fall Risk Assessment, last 12 mths 2019   Able to walk? Yes   Fall in past 12 months?  No   Fall with injury? -   Number of falls in past 12 months -   Fall Risk Score -       Abuse Screen  Patient is not abused    Cognitive Screening   Evaluation of Cognitive Function:  Has your family/caregiver stated any concerns about your memory: no  Normal    Patient Care Team   Patient Care Team:  Torri Ramírez MD as PCP - General (Pediatrics)    Assessment/Plan   Education and counseling provided:  Are appropriate based on today's review and evaluation  End-of-Life planning (with patient's consent)  Pneumococcal Vaccine  Influenza Vaccine  Prostate cancer screening tests (PSA, covered annually)  Colorectal cancer screening tests  Cardiovascular screening blood test  Screening for glaucoma  Diabetes screening test    Diagnoses and all orders for this visit:    1. Medicare annual wellness visit, subsequent   Anticipatory guidance discussed. Immunizations reviewed   HM updated. 2. Essential hypertension  -     AMB POC EKG ROUTINE W/ 12 LEADS, INTER & REP  -     CBC WITH AUTOMATED DIFF  -     METABOLIC PANEL, COMPREHENSIVE    3. Paroxysmal atrial fibrillation (HCC)   Stable and followed by Dr. Azael Gallardo    4. Essential hypertension, benign   Stable and fairly well controlled considering. 5. Screening for hyperlipidemia  -     LIPID PANEL    6. Vitamin D deficiency  -     VITAMIN D, 25 HYDROXY    Health Maintenance   Topic Date Due    Shingrix Vaccine Age 50> (1 of 2) 02/16/1995    FOBT Q 1 YEAR AGE 50-75  02/16/1995    GLAUCOMA SCREENING Q2Y  02/16/2010    Pneumococcal 65+ years (2 of 2 - PPSV23) 04/16/2019    Influenza Age 9 to Adult  08/01/2019    MEDICARE YEARLY EXAM  08/02/2020    DTaP/Tdap/Td series (2 - Td) 07/11/2027    Hepatitis C Screening  Completed     I have discussed the diagnosis with the patient and the intended treatment plan as seen in the above orders. The patient has received an after-visit summary and questions were answered concerning future plans. Asked to return should symptoms worsen or not improve with treatment. Any pending labs and studies will be relayed to patient when they become available. Pt verbalizes understanding of plan of care and denies further questions or concerns at this time.      Follow-up and Dispositions    · Return in about 1 year (around 8/2/2020), or if symptoms worsen or fail to improve, for 6-months for follow up and 1 year for AWV. More than 50% of this 45 minute visit was spent in counseling and reviewing with the patient cardiac history and contacting his cardiologist.     Patient Instructions          Atrial Fibrillation: Care Instructions  Your Care Instructions    Atrial fibrillation is an irregular and often fast heartbeat. Treating this condition is important for several reasons. It can cause blood clots, which can travel from your heart to your brain and cause a stroke. If you have a fast heartbeat, you may feel lightheaded, dizzy, and weak. An irregular heartbeat can also increase your risk for heart failure. Atrial fibrillation is often the result of another heart condition, such as high blood pressure or coronary artery disease. Making changes to improve your heart condition will help you stay healthy and active. Follow-up care is a key part of your treatment and safety. Be sure to make and go to all appointments, and call your doctor if you are having problems. It's also a good idea to know your test results and keep a list of the medicines you take. How can you care for yourself at home? Medicines    · Take your medicines exactly as prescribed. Call your doctor if you think you are having a problem with your medicine. You will get more details on the specific medicines your doctor prescribes.     · If your doctor has given you a blood thinner to prevent a stroke, be sure you get instructions about how to take your medicine safely. Blood thinners can cause serious bleeding problems.     · Do not take any vitamins, over-the-counter drugs, or herbal products without talking to your doctor first.    Lifestyle changes    · Do not smoke. Smoking can increase your chance of a stroke and heart attack. If you need help quitting, talk to your doctor about stop-smoking programs and medicines.  These can increase your chances of quitting for good.     · Eat a heart-healthy diet.     · Stay at a healthy weight. Lose weight if you need to.     · Limit alcohol to 2 drinks a day for men and 1 drink a day for women. Too much alcohol can cause health problems.     · Avoid colds and flu. Get a pneumococcal vaccine shot. If you have had one before, ask your doctor whether you need another dose. Get a flu shot every year. If you must be around people with colds or flu, wash your hands often. Activity    · If your doctor recommends it, get more exercise. Walking is a good choice. Bit by bit, increase the amount you walk every day. Try for at least 30 minutes on most days of the week. You also may want to swim, bike, or do other activities. Your doctor may suggest that you join a cardiac rehabilitation program so that you can have help increasing your physical activity safely.     · Start light exercise if your doctor says it is okay. Even a small amount will help you get stronger, have more energy, and manage stress. Walking is an easy way to get exercise. Start out by walking a little more than you did in the hospital. Gradually increase the amount you walk.     · When you exercise, watch for signs that your heart is working too hard. You are pushing too hard if you cannot talk while you are exercising. If you become short of breath or dizzy or have chest pain, sit down and rest immediately.     · Check your pulse regularly. Place two fingers on the artery at the palm side of your wrist, in line with your thumb. If your heartbeat seems uneven or fast, talk to your doctor. When should you call for help? Call 911 anytime you think you may need emergency care. For example, call if:    · You have symptoms of a heart attack. These may include:  ? Chest pain or pressure, or a strange feeling in the chest.  ? Sweating. ? Shortness of breath. ? Nausea or vomiting.   ? Pain, pressure, or a strange feeling in the back, neck, jaw, or upper belly or in one or both shoulders or arms.  ? Lightheadedness or sudden weakness. ? A fast or irregular heartbeat. After you call 911, the  may tell you to chew 1 adult-strength or 2 to 4 low-dose aspirin. Wait for an ambulance. Do not try to drive yourself.     · You have symptoms of a stroke. These may include:  ? Sudden numbness, tingling, weakness, or loss of movement in your face, arm, or leg, especially on only one side of your body. ? Sudden vision changes. ? Sudden trouble speaking. ? Sudden confusion or trouble understanding simple statements. ? Sudden problems with walking or balance. ? A sudden, severe headache that is different from past headaches.     · You passed out (lost consciousness).    Call your doctor now or seek immediate medical care if:    · You have new or increased shortness of breath.     · You feel dizzy or lightheaded, or you feel like you may faint.     · Your heart rate becomes irregular.     · You can feel your heart flutter in your chest or skip heartbeats. Tell your doctor if these symptoms are new or worse.    Watch closely for changes in your health, and be sure to contact your doctor if you have any problems. Where can you learn more? Go to http://allie-valdemar.info/. Enter U020 in the search box to learn more about \"Atrial Fibrillation: Care Instructions. \"  Current as of: July 22, 2018  Content Version: 12.1  © 4658-1961 [employer]. Care instructions adapted under license by [employer] (which disclaims liability or warranty for this information). If you have questions about a medical condition or this instruction, always ask your healthcare professional. [place] [address] any warranty or liability for your use of this information.

## 2019-08-02 NOTE — PROGRESS NOTES
Patient presents for 3 month follow up for hypertension and complains that Shakila Chessman was changed to Losartan by the Cardiologist.  Luis Anahi that at times if bending over, when he stands back up he feels weak for a few minutes and also has irregular heartbeats at times. Has been trying to speak with the Cardiologist and has left several messages without results.

## 2019-08-02 NOTE — PATIENT INSTRUCTIONS

## 2019-08-02 NOTE — LETTER
2019 12:51 PM 
 
RE:     Michael Davis [address] [facility] [id number] : 1945 Dear Dr. Susette Boeck,  
 
I am sending you an update on our mutual patient, Mr. Ana Luisa Villavicencio. He was in to see me today. He reports that since starting Cozaar (losartan) a few weeks ago, he has had episodes where he thought he was having an irregular heart beat and feeling light headed when he moved in certain positions. Previously, he had been on Irbesartan due to the recall of Losartan and Valsartan. He tolerated this medication fairly well. I did obtain an EKG and it is included with this note. I don't have a recent comparison, but you will see the pattern. He tells me that he is scheduled to see you about the end of August. I reviewed his PMH and note the A. Fib and cardiomyopathy with transient decrease in EF. Per your last note received in December, his ECHO showed normalization of these findings. Problem List:    
Patient Active Problem List  
 Diagnosis Date Noted  Paroxysmal atrial fibrillation ([facility] [address] 2018  Melanoma of left upper arm ([facility] [address] 2016  Elevated TSH 2016 Medical History:    
Past Medical History:  
Diagnosis Date  Family history of skin cancer   
 brother-melanoma  Hyperlipidemia  Hypertension  Sun-damaged skin Allergies: Allergies Allergen Reactions  Codeine Other (comments) Bad dreams Medications:    
Current Outpatient Medications Medication Sig  
 losartan (COZAAR) 50 mg tablet TAKE 1 TABLET BY MOUTH EVERY DAY  terazosin (HYTRIN) 2 mg capsule TAKE 1 CAPSULE BY MOUTH EVERYDAY AT BEDTIME  carvedilol (COREG) 12.5 mg tablet TAKE 1 TABLET BY MOUTH TWICE A DAY WITH MEALS No current facility-administered medications for this visit. Surgical History:    
Past Surgical History:  
Procedure Laterality Date  COLONOSCOPY N/A 2019 COLONOSCOPY performed by Lo Kumar MD at [address] [place] [address]  HX TONSILLECTOMY    
 at 10years old I appreciate your assistance in Mr. Jaymie Almanzar care, and look forward to your findings and recommendations. Sincerely, MD Tish Nick MD

## 2019-08-08 ENCOUNTER — OFFICE VISIT (OUTPATIENT)
Dept: DERMATOLOGY | Facility: AMBULATORY SURGERY CENTER | Age: 74
End: 2019-08-08

## 2019-08-08 VITALS
TEMPERATURE: 98.2 F | HEIGHT: 73 IN | RESPIRATION RATE: 18 BRPM | OXYGEN SATURATION: 97 % | SYSTOLIC BLOOD PRESSURE: 140 MMHG | DIASTOLIC BLOOD PRESSURE: 70 MMHG | BODY MASS INDEX: 30.35 KG/M2 | HEART RATE: 76 BPM | WEIGHT: 229 LBS

## 2019-08-08 DIAGNOSIS — L70.0 OPEN COMEDONE: ICD-10-CM

## 2019-08-08 DIAGNOSIS — Z85.820 PERSONAL HISTORY OF MALIGNANT MELANOMA OF SKIN: ICD-10-CM

## 2019-08-08 DIAGNOSIS — L81.4 LENTIGINES: ICD-10-CM

## 2019-08-08 DIAGNOSIS — L82.1 SEBORRHEIC KERATOSES: Primary | ICD-10-CM

## 2019-08-08 DIAGNOSIS — D22.9 MULTIPLE BENIGN NEVI: ICD-10-CM

## 2019-08-08 NOTE — PROGRESS NOTES
Written by Bernardo Ann, as dictated by Davey Garcia, [address]. Name: Paola Reed       Age: 76 y.o. Date: 8/8/2019    Chief Complaint:   Chief Complaint   Patient presents with    Skin Exam     left upper arm, mid back       Subjective:    HPI  Mr. Paola Reed is a 76 y.o. male who presents for a full skin exam.  The patient's last skin exam was on 4/10/19 and the patient does not have current complaints related to his skin. He is feeling well and in his usual state of health today. He has no current illnesses, no other skin concerns. His allergies, medications, medical, and social history are reviewed by me today. The patient's pertinent skin history includes : MM, AK  -MM, left upper arm, excision on 4/15/19 Stage 1 a malignant melanoma of the left forearm, breslow depth of 0.55 mm  -MM, upper back, excision on 4/15/19 with breslow depth of 0.58 mm    ROS: Constitutional: Negative.     Dermatological : negative      Social History     Socioeconomic History    Marital status:      Spouse name: Not on file    Number of children: Not on file    Years of education: Not on file    Highest education level: Not on file   Occupational History    Not on file   Social Needs    Financial resource strain: Not on file    Food insecurity:     Worry: Not on file     Inability: Not on file    Transportation needs:     Medical: Not on file     Non-medical: Not on file   Tobacco Use    Smoking status: Never Smoker    Smokeless tobacco: Never Used   Substance and Sexual Activity    Alcohol use: No     Alcohol/week: 0.0 standard drinks    Drug use: No    Sexual activity: Not Currently   Lifestyle    Physical activity:     Days per week: Not on file     Minutes per session: Not on file    Stress: Not on file   Relationships    Social connections:     Talks on phone: Not on file     Gets together: Not on file     Attends Restorationist service: Not on file     Active member of club or organization: Not on file     Attends meetings of clubs or organizations: Not on file     Relationship status: Not on file    Intimate partner violence:     Fear of current or ex partner: Not on file     Emotionally abused: Not on file     Physically abused: Not on file     Forced sexual activity: Not on file   Other Topics Concern    Not on file   Social History Narrative    Not on file       Family History   Problem Relation Age of Onset    Other Mother         hypotension    Heart Failure Father     Cancer Father         Prostate    Other Father         PUD    Heart Disease Father         Heart Valve issue and replacement    Cancer Brother         Melanoma -  at 39yrs old   Sinha Cancer Sister         Hodgkin's in one, melanoma in other    Cancer Paternal Uncle         Prostate    Cancer Maternal Grandfather         Lung       Past Medical History:   Diagnosis Date    Family history of skin cancer     brother-melanoma    Hyperlipidemia     Hypertension     Sun-damaged skin        Past Surgical History:   Procedure Laterality Date    COLONOSCOPY N/A 2019    COLONOSCOPY [facility] by Trev Brumfield [facility]    HX TONSILLECTOMY      at 10years old       Current Outpatient Medications   Medication Sig Dispense Refill    losartan (COZAAR) 50 mg tablet TAKE 1 TABLET BY MOUTH EVERY DAY  3    terazosin (HYTRIN) 2 mg capsule TAKE 1 CAPSULE BY MOUTH EVERYDAY AT BEDTIME 90 Cap 0    carvedilol (COREG) 12.5 mg tablet TAKE 1 TABLET BY MOUTH TWICE A DAY WITH MEALS 180 Tab 0       Allergies   Allergen Reactions    Codeine Other (comments)     Bad dreams         Objective:    Visit Vitals  /70 (BP 1 Location: Left arm, BP Patient Position: Sitting)   Pulse 76   Temp 98.2 °F (36.8 °C) (Oral)   Resp 18   Ht 6' 1\" (1.854 m)   Wt 229 lb (103.9 kg)   SpO2 97%   BMI 30.21 kg/m²       Mitzy Li is a 76 y.o. male who appears well and in no distress. He is awake, alert, and oriented. There is no preauricular, submandibular, cervical, or axillary lymphadenopathy. A skin examination was performed including his scalp, face (including eyelids), ears, neck, chest, back, abdomen, upper extremities (including digits/nails), lower extremities, breasts; genital skin was not examined. He has well healed scars on the upper back and left upper arm without evidence of pigment nodularity or lesion recurrence. There are lentigines on sun exposed areas. He has scattered waxy macules and keratotic papules consistent with seborrheic keratoses. He has  pink intradermal nevi and brown junctional nevi, no concerning features for severe atypia. He has an open comedone/ small cyst on the right temple. Assessment/Plan:  1. Personal history of melanoma skin cancer. I discussed sun protection, sunscreen use, the warning signs of skin cancer, the need for self-skin examinations, and the need for regular practitioner exams. The patient should follow up sooner as needed if new, changing, or symptomatic skin lesions arise. 2. Solar lentigos. The diagnosis and relationship to sun exposure was reviewed. Sun protection advised. 3. Seborrheic keratoses. The diagnosis was reviewed and the patient was reassured that no treatment is needed for these benign lesions. 4. Normal nevi. The diagnosis of normal nevi was reviewed. I discussed sun protection, sunscreen use, the warning signs of skin cancer, mole monitoring, the need for self-skin examinations, and the need for regular practitioner exams. The patient should follow up sooner as needed if new, changing, or symptomatic skin lesions arise. 5. Open comedone. The diagnosis was discussed and the patient desires removal. After verbal permission and the material was successfully extracted with a comedone extractor. Next skin exam:  4 months        This plan was reviewed with the patient and patient agrees. All questions were answered.     This scribe documentation was reviewed by me and accurately reflects the examination and decisions made by me.

## 2019-08-08 NOTE — PROGRESS NOTES
Identified pt with two pt identifiers(name and ). Reviewed record in preparation for visit and have obtained necessary documentation. All patient medications has been reviewed. Chief Complaint   Patient presents with    Skin Exam     left upper arm, mid back       Health Maintenance Due   Topic    Shingrix Vaccine Age 50> (1 of 2)    FOBT Q 1 YEAR AGE 50-75     GLAUCOMA SCREENING Q2Y     Pneumococcal 65+ years (2 of 2 - PPSV23)    Influenza Age 5 to Adult        Vitals:    19 1400   BP: 140/70   Pulse: 76   Resp: 18   Temp: 98.2 °F (36.8 °C)   TempSrc: Oral   SpO2: 97%   Weight: 103.9 kg (229 lb)   Height: 6' 1\" (1.854 m)   PainSc:   0 - No pain       Coordination of Care Questionnaire:   1) Have you been to an emergency room, urgent care, or hospitalized since your last visit?   no       2. Have seen or consulted any other health care provider since your last visit? YES  19 PCP  Cardio Dr. Kalyan Schulz 19  3) Do you have an Advanced Directive/ Living Will in place? NO  If yes, do we have a copy on file NO  If no, would you like information NO    Patient is accompanied by self I have received verbal consent from Ramakrishna Hadley to discuss any/all medical information while they are present in the room.

## 2019-08-13 ENCOUNTER — HOSPITAL ENCOUNTER (OUTPATIENT)
Dept: LAB | Age: 74
Discharge: HOME | End: 2019-08-13
Payer: MEDICARE

## 2019-08-13 PROCEDURE — 36415 COLL VENOUS BLD VENIPUNCTURE: CPT

## 2019-08-13 PROCEDURE — 80061 LIPID PANEL: CPT

## 2019-08-13 PROCEDURE — 80053 COMPREHEN METABOLIC PANEL: CPT

## 2019-08-13 PROCEDURE — 82306 VITAMIN D 25 HYDROXY: CPT

## 2019-08-13 PROCEDURE — 85025 COMPLETE CBC W/AUTO DIFF WBC: CPT

## 2019-08-14 LAB
25(OH)D3+25(OH)D2 SERPL-MCNC: 34.9 NG/ML (ref 30–100)
ALBUMIN SERPL-MCNC: 4 G/DL (ref 3.5–4.8)
ALBUMIN/GLOB SERPL: 1.3 {RATIO} (ref 1.2–2.2)
ALP SERPL-CCNC: 89 IU/L (ref 39–117)
ALT SERPL-CCNC: 11 IU/L (ref 0–44)
AST SERPL-CCNC: 14 IU/L (ref 0–40)
BASOPHILS # BLD AUTO: 0 X10E3/UL (ref 0–0.2)
BASOPHILS NFR BLD AUTO: 1 %
BILIRUB SERPL-MCNC: 0.9 MG/DL (ref 0–1.2)
BUN SERPL-MCNC: 14 MG/DL (ref 8–27)
BUN/CREAT SERPL: 14 (ref 10–24)
CALCIUM SERPL-MCNC: 9.6 MG/DL (ref 8.6–10.2)
CHLORIDE SERPL-SCNC: 104 MMOL/L (ref 96–106)
CHOLEST SERPL-MCNC: 154 MG/DL (ref 100–199)
CO2 SERPL-SCNC: 24 MMOL/L (ref 20–29)
CREAT SERPL-MCNC: 1.02 MG/DL (ref 0.76–1.27)
EOSINOPHIL # BLD AUTO: 0.1 X10E3/UL (ref 0–0.4)
EOSINOPHIL NFR BLD AUTO: 1 %
ERYTHROCYTE [DISTWIDTH] IN BLOOD BY AUTOMATED COUNT: 13.8 % (ref 12.3–15.4)
GLOBULIN SER CALC-MCNC: 3 G/DL (ref 1.5–4.5)
GLUCOSE SERPL-MCNC: 97 MG/DL (ref 65–99)
HCT VFR BLD AUTO: 47.6 % (ref 37.5–51)
HDLC SERPL-MCNC: 36 MG/DL
HGB BLD-MCNC: 15.1 G/DL (ref 13–17.7)
IMM GRANULOCYTES # BLD AUTO: 0 X10E3/UL (ref 0–0.1)
IMM GRANULOCYTES NFR BLD AUTO: 0 %
INTERPRETATION, 910389: NORMAL
LDLC SERPL CALC-MCNC: 99 MG/DL (ref 0–99)
LYMPHOCYTES # BLD AUTO: 2.1 X10E3/UL (ref 0.7–3.1)
LYMPHOCYTES NFR BLD AUTO: 33 %
MCH RBC QN AUTO: 30.3 PG (ref 26.6–33)
MCHC RBC AUTO-ENTMCNC: 31.7 G/DL (ref 31.5–35.7)
MCV RBC AUTO: 95 FL (ref 79–97)
MONOCYTES # BLD AUTO: 0.7 X10E3/UL (ref 0.1–0.9)
MONOCYTES NFR BLD AUTO: 11 %
NEUTROPHILS # BLD AUTO: 3.5 X10E3/UL (ref 1.4–7)
NEUTROPHILS NFR BLD AUTO: 54 %
PLATELET # BLD AUTO: 296 X10E3/UL (ref 150–450)
POTASSIUM SERPL-SCNC: 4.3 MMOL/L (ref 3.5–5.2)
PROT SERPL-MCNC: 7 G/DL (ref 6–8.5)
RBC # BLD AUTO: 4.99 X10E6/UL (ref 4.14–5.8)
SODIUM SERPL-SCNC: 143 MMOL/L (ref 134–144)
TRIGL SERPL-MCNC: 94 MG/DL (ref 0–149)
VLDLC SERPL CALC-MCNC: 19 MG/DL (ref 5–40)
WBC # BLD AUTO: 6.4 X10E3/UL (ref 3.4–10.8)

## 2019-08-26 DIAGNOSIS — I10 ESSENTIAL HYPERTENSION: ICD-10-CM

## 2019-08-26 RX ORDER — CARVEDILOL 12.5 MG/1
TABLET ORAL
Qty: 180 TAB | Refills: 0 | Status: SHIPPED | OUTPATIENT
Start: 2019-08-26 | End: 2019-11-22 | Stop reason: SDUPTHER

## 2019-10-11 DIAGNOSIS — I10 ESSENTIAL HYPERTENSION, BENIGN: ICD-10-CM

## 2019-10-11 RX ORDER — TERAZOSIN 2 MG/1
CAPSULE ORAL
Qty: 90 CAP | Refills: 0 | Status: SHIPPED | OUTPATIENT
Start: 2019-10-11 | End: 2020-01-07

## 2019-11-22 DIAGNOSIS — I10 ESSENTIAL HYPERTENSION: ICD-10-CM

## 2019-11-22 RX ORDER — CARVEDILOL 12.5 MG/1
TABLET ORAL
Qty: 180 TAB | Refills: 0 | Status: SHIPPED | OUTPATIENT
Start: 2019-11-22 | End: 2020-03-02 | Stop reason: DRUGHIGH

## 2020-01-07 DIAGNOSIS — I10 ESSENTIAL HYPERTENSION, BENIGN: ICD-10-CM

## 2020-01-07 RX ORDER — TERAZOSIN 2 MG/1
CAPSULE ORAL
Qty: 90 CAP | Refills: 0 | Status: SHIPPED | OUTPATIENT
Start: 2020-01-07 | End: 2020-03-31

## 2020-01-09 ENCOUNTER — OFFICE VISIT (OUTPATIENT)
Dept: DERMATOLOGY | Facility: AMBULATORY SURGERY CENTER | Age: 75
End: 2020-01-09

## 2020-01-09 VITALS
OXYGEN SATURATION: 97 % | BODY MASS INDEX: 30.35 KG/M2 | HEART RATE: 55 BPM | WEIGHT: 229 LBS | DIASTOLIC BLOOD PRESSURE: 70 MMHG | TEMPERATURE: 97.7 F | RESPIRATION RATE: 16 BRPM | HEIGHT: 73 IN | SYSTOLIC BLOOD PRESSURE: 140 MMHG

## 2020-01-09 DIAGNOSIS — Z85.820 PERSONAL HISTORY OF MALIGNANT MELANOMA OF SKIN: ICD-10-CM

## 2020-01-09 DIAGNOSIS — L81.4 LENTIGINES: ICD-10-CM

## 2020-01-09 DIAGNOSIS — D22.9 MULTIPLE BENIGN NEVI: Primary | ICD-10-CM

## 2020-01-09 DIAGNOSIS — L82.1 SEBORRHEIC KERATOSES: ICD-10-CM

## 2020-01-09 RX ORDER — APIXABAN 5 MG/1
5 TABLET, FILM COATED ORAL 2 TIMES DAILY
COMMUNITY
Start: 2019-11-13

## 2020-01-09 RX ORDER — SPIRONOLACTONE 25 MG/1
TABLET ORAL
COMMUNITY
Start: 2020-01-08

## 2020-01-09 NOTE — PROGRESS NOTES
1. Have you been to the ER, urgent care clinic since your last visit? Hospitalized since your last visit? Yes When: 10/2019 Where: [facility] Reason for visit: Heart problem    2. Have you seen or consulted any other health care providers outside of the [address] [place] [address] since your last visit? Include any pap smears or colon screening.  No     Chief Complaint   Patient presents with    Follow-up       Visit Vitals  /70 (BP 1 Location: Left arm, BP Patient Position: Sitting)   Pulse (!) 55   Temp 97.7 °F (36.5 °C) (Oral)   Resp 16   Ht 6' 1\" (1.854 m)   Wt 229 lb (103.9 kg)   SpO2 97%   BMI 30.21 kg/m²

## 2020-01-09 NOTE — PROGRESS NOTES
Written by Rod Ivan, as dictated by Polly Sportsman Duane Burrow, [address]. Name: Lucille Day       Age: 76 y.o. Date: 1/9/2020    Chief Complaint:   Chief Complaint   Patient presents with    Follow-up       Subjective:    HPI  Mr. Lucille Day is a 76 y.o. male who presents for a full skin exam.  The patient's last skin exam was on 8/8/19 and the patient does not have current complaints related to his skin. He is feeling well and in his usual state of health today. He has no current illnesses, no other skin concerns. His allergies, medications, medical, and social history are reviewed by me today. He reports that he has been hospitalized for A-fib since his last visit and is now on Eloquis    The patient's pertinent skin history includes : Persona history of MM, [place]. Family history of MM in his brother, which he passed from  -MM, left upper arm, excision on 8/23/16 Stage 1 a malignant melanoma of the left forearm, breslow depth of 0.55 mm  -MM, upper back, excision on 4/15/19 with breslow depth of 0.58 mm, stage 1a    ROS: Constitutional: Negative.     Dermatological : negative    Social History     Socioeconomic History    Marital status:      Spouse name: Not on file    Number of children: Not on file    Years of education: Not on file    Highest education level: Not on file   Occupational History    Not on file   Social Needs    Financial resource strain: Not on file    Food insecurity:     Worry: Not on file     Inability: Not on file    Transportation needs:     Medical: Not on file     Non-medical: Not on file   Tobacco Use    Smoking status: Never Smoker    Smokeless tobacco: Never Used   Substance and Sexual Activity    Alcohol use: No     Alcohol/week: 0.0 standard drinks    Drug use: No    Sexual activity: Not Currently   Lifestyle    Physical activity:     Days per week: Not on file     Minutes per session: Not on file    Stress: Not on file   Relationships  Social connections:     Talks on phone: Not on file     Gets together: Not on file     Attends [employer] service: Not on file     Active member of club or organization: Not on file     Attends meetings of clubs or organizations: Not on file     Relationship status: Not on file    Intimate partner violence:     Fear of current or ex partner: Not on file     Emotionally abused: Not on file     Physically abused: Not on file     Forced sexual activity: Not on file   Other Topics Concern    Not on file   Social History Narrative    Not on file       Family History   Problem Relation Age of Onset    Other Mother         hypotension    Heart Failure Father     Cancer Father         Prostate    Other Father         PUD    Heart Disease Father         Heart Valve issue and replacement    Cancer Brother         Melanoma -  at 39yrs old   Alyne Deb Cancer Sister         Hodgkin's in one, melanoma in other    Cancer Paternal Uncle         Prostate    Cancer Maternal Grandfather         Lung       Past Medical History:   Diagnosis Date    Family history of skin cancer     brother-melanoma    Hyperlipidemia     Hypertension     Sun-damaged skin        Past Surgical History:   Procedure Laterality Date    COLONOSCOPY N/A 2019    COLONOSCOPY performed by Angelina Gil MD at [facility]    HX TONSILLECTOMY      at 10years old       Current Outpatient Medications   Medication Sig Dispense Refill    ELIQUIS 5 mg tablet       spironolactone (ALDACTONE) 25 mg tablet       terazosin (HYTRIN) 2 mg capsule TAKE 1 CAPSULE BY MOUTH EVERYDAY AT BEDTIME 90 Cap 0    carvedilol (COREG) 12.5 mg tablet TAKE 1 TABLET BY MOUTH TWICE A DAY WITH MEALS 180 Tab 0    losartan (COZAAR) 50 mg tablet TAKE 1 TABLET BY MOUTH EVERY DAY  3       Allergies   Allergen Reactions    Codeine Other (comments)     Bad dreams         Objective:    Visit Vitals  /70 (BP 1 Location: Left arm, BP Patient Position: Sitting)   Pulse (!) 55 Temp 97.7 °F (36.5 °C) (Oral)   Resp 16   Ht 6' 1\" (1.854 m)   Wt 229 lb (103.9 kg)   SpO2 97%   BMI 30.21 kg/m²       Wing Donohue is a 76 y.o. male who appears well and in no distress. He is awake, alert, and oriented. There is no preauricular, submandibular, or cervical lymphadenopathy. A skin examination was performed including his scalp, face (including eyelids), ears, neck, chest, back, abdomen, upper extremities (including digits/nails), lower extremities, breasts, buttocks; genital skin was not examined. He has well healed melanoma scars on the left upper arm and upper back without pigment, nodularity, or other evidence of lesion recurrence. There is an 3mm excoriation on the mid portion of the back scar. No associated palpable right or left axillary lymphadenopathy. There are lentigines on sun exposed areas. He has scattered waxy macules and keratotic papules consistent with seborrheic keratoses. He has pink intradermal nevi and brown junctional nevi, no concerning features for severe atypia. Assessment/Plan:  1. Personal history of melanoma skin cancer. I discussed sun protection, sunscreen use, the warning signs of skin cancer, the need for self-skin examinations, and the need for regular practitioner exams. The patient should follow up sooner as needed if new, changing, or symptomatic skin lesions arise. I will send the recent melanoma tumor for the Decision Dx testing. 2. Solar lentigos. The diagnosis and relationship to sun exposure was reviewed. Sun protection advised. 3. Seborrheic keratoses. The diagnosis was reviewed and the patient was reassured that no treatment is needed for these benign lesions. 4. Normal nevi. The diagnosis of normal nevi was reviewed. I discussed sun protection, sunscreen use, the warning signs of skin cancer, mole monitoring, the need for self-skin examinations, and the need for regular practitioner exams.   The patient should follow up sooner as needed if new, changing, or symptomatic skin lesions arise. 5.Family history of skin cancer. I discussed sun protection, sunscreen use, the warning signs of skin cancer, the need for self-skin examinations, and the need for regular practitioner exams. The patient should follow up sooner as needed if new, changing, or symptomatic skin lesions arise. Next skin exam:  3 months    This plan was reviewed with the patient and patient agrees. All questions were answered. This scribe documentation was reviewed by me and accurately reflects the examination and decisions made by me.

## 2020-01-28 ENCOUNTER — TELEPHONE (OUTPATIENT)
Dept: DERMATOLOGY | Facility: AMBULATORY SURGERY CENTER | Age: 75
End: 2020-01-28

## 2020-01-28 NOTE — TELEPHONE ENCOUNTER
LM for pt to call to discuss Clinton Romero results - 1a, low risk of recurrence of this melanoma tested.

## 2020-01-28 NOTE — TELEPHONE ENCOUNTER
I spoke with the pt and discussed his Syliva Her results. F/up as scheduled. No further testing needed right now.

## 2020-02-10 ENCOUNTER — OFFICE VISIT (OUTPATIENT)
Dept: FAMILY MEDICINE CLINIC | Age: 75
End: 2020-02-10

## 2020-02-10 VITALS
RESPIRATION RATE: 18 BRPM | WEIGHT: 220 LBS | HEART RATE: 75 BPM | TEMPERATURE: 97.7 F | DIASTOLIC BLOOD PRESSURE: 80 MMHG | OXYGEN SATURATION: 97 % | BODY MASS INDEX: 29.16 KG/M2 | SYSTOLIC BLOOD PRESSURE: 142 MMHG | HEIGHT: 73 IN

## 2020-02-10 DIAGNOSIS — I10 ESSENTIAL HYPERTENSION: Primary | ICD-10-CM

## 2020-02-10 NOTE — PROGRESS NOTES
Identified pt with two pt identifiers(name and ). Chief Complaint   Patient presents with   [address]     patient is not fasting        Health Maintenance Due   Topic    Shingrix Vaccine Age 50> (1 of 2)    FOBT Q1Y Age 54-65     GLAUCOMA SCREENING Q2Y     Pneumococcal 65+ years (2 of 2 - PPSV23)       Wt Readings from Last 3 Encounters:   02/10/20 220 lb (99.8 kg)   20 229 lb (103.9 kg)   19 229 lb (103.9 kg)     Temp Readings from Last 3 Encounters:   02/10/20 97.7 °F (36.5 °C) (Oral)   20 97.7 °F (36.5 °C) (Oral)   19 98.2 °F (36.8 °C) (Oral)     BP Readings from Last 3 Encounters:   02/10/20 142/80   20 140/70   19 140/70     Pulse Readings from Last 3 Encounters:   02/10/20 75   20 (!) 55   19 76         Learning Assessment:  :     Learning Assessment 2016 2016 3/21/2016   PRIMARY LEARNER Patient Patient Patient   HIGHEST LEVEL OF EDUCATION - PRIMARY LEARNER  - - 4 YEARS OF COLLEGE   BARRIERS PRIMARY LEARNER - NONE NONE   CO-LEARNER CAREGIVER - No No   PRIMARY LANGUAGE ENGLISH ENGLISH ENGLISH   LEARNER PREFERENCE PRIMARY DEMONSTRATION DEMONSTRATION DEMONSTRATION     - - READING     - - LISTENING   LEARNING SPECIAL TOPICS no no -   ANSWERED BY patient patient patient   RELATIONSHIP SELF SELF SELF   ASSESSMENT COMMENT none none -       Depression Screening:  :     3 most recent PHQ Screens 2020   Little interest or pleasure in doing things Not at all   Feeling down, depressed, irritable, or hopeless Not at all   Total Score PHQ 2 0       Fall Risk Assessment:  :     Fall Risk Assessment, last 12 mths 2020   Able to walk? Yes   Fall in past 12 months? No   Fall with injury? -   Number of falls in past 12 months -   Fall Risk Score -       Abuse Screening:  :     Abuse Screening Questionnaire 2019   Do you ever feel afraid of your partner?  N N   Are you in a relationship with someone who physically or mentally threatens you? N N   Is it safe for you to go home? Y Y       Coordination of Care Questionnaire:  :     1) Have you been to an emergency room, urgent care clinic since your last visit? no   Hospitalized since your last visit? no             2) Have you seen or consulted any other health care providers outside of [address] [place] [address] since your last visit? no  (Include any pap smears or colon screenings in this section.)    3) Do you have an Advance Directive on file? no  Are you interested in receiving information about Advance Directives? no    Reviewed record in preparation for visit and have obtained necessary documentation. Medication reconciliation up to date and corrected with patient at this time.

## 2020-03-02 ENCOUNTER — HOSPITAL ENCOUNTER (OUTPATIENT)
Dept: LAB | Age: 75
Discharge: HOME | End: 2020-03-02

## 2020-03-02 ENCOUNTER — OFFICE VISIT (OUTPATIENT)
Dept: FAMILY MEDICINE CLINIC | Age: 75
End: 2020-03-02

## 2020-03-02 VITALS
SYSTOLIC BLOOD PRESSURE: 158 MMHG | BODY MASS INDEX: 29.03 KG/M2 | RESPIRATION RATE: 16 BRPM | TEMPERATURE: 98.1 F | OXYGEN SATURATION: 99 % | DIASTOLIC BLOOD PRESSURE: 83 MMHG | HEART RATE: 63 BPM | HEIGHT: 73 IN | WEIGHT: 219 LBS

## 2020-03-02 DIAGNOSIS — Z86.39 HISTORY OF HYPOTHYROIDISM: ICD-10-CM

## 2020-03-02 DIAGNOSIS — Z12.11 SCREENING FOR COLON CANCER: ICD-10-CM

## 2020-03-02 DIAGNOSIS — I48.0 PAROXYSMAL ATRIAL FIBRILLATION (HCC): Primary | ICD-10-CM

## 2020-03-02 DIAGNOSIS — I10 ESSENTIAL HYPERTENSION: ICD-10-CM

## 2020-03-02 LAB
T4 FREE SERPL-MCNC: 1 NG/DL (ref 0.8–1.5)
TSH SERPL DL<=0.05 MIU/L-ACNC: 2.98 UIU/ML (ref 0.36–3.74)

## 2020-03-02 RX ORDER — ASPIRIN 81 MG/1
81 TABLET ORAL
COMMUNITY

## 2020-03-02 RX ORDER — CARVEDILOL 25 MG/1
25 TABLET ORAL 2 TIMES DAILY
COMMUNITY

## 2020-03-02 RX ORDER — LOSARTAN POTASSIUM 100 MG/1
1 TABLET ORAL
COMMUNITY
Start: 2020-02-15

## 2020-03-02 NOTE — PROGRESS NOTES
[facility]  Follow-Up Progress Note  Patient: Jesusita Rose  1945, 76 y.o., male  Encounter Date: 3/2/2020    ASSESSMENT & PLAN:  Diagnoses and all orders for this visit:    1. Paroxysmal atrial fibrillation (HCC)  -     REFERRAL TO CARDIOLOGY  75M with h/o Paroxysmal A.fib. Over the past 3-months, he has needed to be cardioverted x 2. Once in [place] and once in [facility]. Previous cardiologist in [place] was Dr. Clive Springer. Also has a cardiologist in [place] - Dr. Matilda Bolden. First episode October 2019. Was in Afib. Saw Dr. Matilda Bolden. Cardioverted after being placed on Eliquis. EKG and ECHO done at that time were reportedly normal. Returned from [place] in on Feb 4, had episode of odd sensation from feet coming up to abdomen felt profoundly sick with vomiting and noted to be in A.fib once again. This time, EKG was done and Dr. Clive Springer had him come back on 2/10/2020 for cardioversion once again. No change in medications. However, while he remains in NSR at this time (he monitors with watch and has an niki) he is still concerned about possible future events    2. Essential hypertension   May require adjustment in medications for both A. Fib and HTN. Will ask cardiology for assistance based on the concerns noted in the HPI below. BP Readings from Last 3 Encounters:   03/02/20 158/83   02/10/20 142/80   01/09/20 140/70     3. History of hypothyroidism  -     TSH 3RD GENERATION; Future  -     T4, FREE; Future  - Will check to make sure this is still well controlled. 4. Screening for colon cancer  -     OCCULT BLOOD IMMUNOASSAY,DIAGNOSTIC; Future    Other orders  -     pneumococcal 23-valent (PNEUMOVAX 23) 25 mcg/0.5 mL injection; 0.5 mL by IntraMUSCular route PRIOR TO DISCHARGE for 1 dose.  -     varicella-zoster recombinant, PF, (SHINGRIX, PF,) 50 mcg/0.5 mL susr injection; 0.5 mL by IntraMUSCular route once for 1 dose.         Orders Placed This Encounter    losartan (COZAAR) 100 mg tablet     Sig: Take 1 Tab by mouth once over twenty-four (24) hours.  carvediloL (COREG) 25 mg tablet     Sig: Take 25 mg by mouth two (2) times a day.  aspirin delayed-release 81 mg tablet     Sig: Take 81 mg by mouth once over twenty-four (24) hours. CHIEF COMPLAINT:  Chief Complaint   Patient presents with    Hypertension    Labs       SUBJECTIVE:  Goyo Cuellar is a 76 y.o. male presenting today for follow up and review of current medical problems. I note that Mr. Jude Murrieta has a h/o paroxsymal A.fib. For sometime, he had been on Amiodarone and developed mild pulmonary and thyroid issues (which seemingly have resolved). He had been doing well and is followed by Dr. Karen Johnson (cardiology) in [place]. However, in [place] 2019, he went into A.fib and was in [place] at the time and seen by Dr. Cesar Gutiérrez. At that time, he underwent cardioversion x 2. He was given IV Amiodarone. He was started on Eliquis. In 1200 Jenny Sai Willams and Coreg were increased. Reportedly an EGK, ECHO were also done and were normal per his report. He continued in NSR until 2/4/2020. He experienced a strange sensation in his feet and lower extremeties that ascended to the point that he was nauseated and vomited. This seemed to re-trigger his A.fib. He saw Dr. Karen Johnson who did another cardioversion on 2/10/2020. He has since remained in NSR. In addition to Eliquis he is also on ASA. No other medication changes were made at that time. Unfortunately, he remains somewhat  hypertensive. Currently in NSR, but concern is that he has had several episodes of paraxosymal A.fib in past 3-months. Would like second opinion from a different cardiologist on management. Review of Systems   Respiratory: Negative for chest tightness and shortness of breath. Cardiovascular: Negative for chest pain, palpitations and leg swelling. All other systems reviewed and are negative.       OBJECTIVE:  Visit Vitals  /83 (BP 1 Location: Left arm, BP Patient Position: Sitting)   Pulse 63   Temp 98.1 °F (36.7 °C) (Oral)   Resp 16   Ht 6' 1\" (1.854 m)   Wt 219 lb (99.3 kg)   SpO2 99%   BMI 28.89 kg/m²       Physical Exam  Vitals signs and nursing note reviewed. Constitutional:       Appearance: Normal appearance. HENT:      Head: Normocephalic. Mouth/Throat:      Mouth: Mucous membranes are moist.   Eyes:      Extraocular Movements: Extraocular movements intact. Pupils: Pupils are equal, round, and reactive to light. Neck:      Musculoskeletal: Normal range of motion and neck supple. Cardiovascular:      Rate and Rhythm: Normal rate and regular rhythm. Pulses: Normal pulses. Heart sounds: Normal heart sounds. Pulmonary:      Breath sounds: Normal breath sounds. Abdominal:      General: Abdomen is flat. Palpations: Abdomen is soft. Skin:     General: Skin is warm. Neurological:      Mental Status: He is alert.          HISTORICAL:  Reviewed and updated today, and as noted below:    Past Medical History:   Diagnosis Date    Atrial fibrillation ([facility] [address]     Family history of skin cancer     brother-melanoma    Hyperlipidemia     Hypertension     Sun-damaged skin      Past Surgical History:   Procedure Laterality Date    COLONOSCOPY N/A 2019    COLONOSCOPY performed by Reyes [facility] at [facility]      at 10years old     Family History   Problem Relation Age of Onset    Other Mother         hypotension    Heart Failure Father     Cancer Father         Prostate    Other Father         PUD    Heart Disease Father         Heart Valve issue and replacement    Cancer Brother         Melanoma -  at 39yrs old   [employer] Cancer Sister         Hodgkin's in one, melanoma in other   [employer] Cancer Paternal Uncle         Prostate    Cancer Maternal Grandfather         Lung     Social History     Tobacco Use   Smoking Status Never Smoker   Smokeless Tobacco Never Used     Social History     Socioeconomic History    Marital status:      Spouse name: Not on file    Number of children: Not on file    Years of education: Not on file    Highest education level: Not on file   Tobacco Use    Smoking status: Never Smoker    Smokeless tobacco: Never Used   Substance and Sexual Activity    Alcohol use: No     Alcohol/week: 0.0 standard drinks    Drug use: Never    Sexual activity: Not Currently     Medication  Current Outpatient Medications   Medication Sig Dispense Refill    losartan (COZAAR) 100 mg tablet Take 1 Tab by mouth once over twenty-four (24) hours.  carvediloL (COREG) 25 mg tablet Take 25 mg by mouth two (2) times a day.  aspirin delayed-release 81 mg tablet Take 81 mg by mouth once over twenty-four (24) hours.  ELIQUIS 5 mg tablet       spironolactone (ALDACTONE) 25 mg tablet       terazosin (HYTRIN) 2 mg capsule TAKE 1 CAPSULE BY MOUTH EVERYDAY AT BEDTIME 90 Cap 0       Allergies   Allergen Reactions    Codeine Other (comments)     Bad dreams       No visits with results within 3 Month(s) from this visit. Latest known visit with results is:   Office Visit on 08/02/2019   Component Date Value Ref Range Status    Cholesterol, total 08/13/2019 154  100 - 199 mg/dL Final    Triglyceride 08/13/2019 94  0 - 149 mg/dL Final    HDL Cholesterol 08/13/2019 36* >39 mg/dL Final    VLDL, calculated 08/13/2019 19  5 - 40 mg/dL Final    LDL, calculated 08/13/2019 99  0 - 99 mg/dL Final    VITAMIN D, 25-HYDROXY 08/13/2019 34.9  30.0 - 100.0 ng/mL Final    Comment: Vitamin D deficiency has been defined by the [facility] practice guideline as a  level of serum 25-OH vitamin D less than 20 ng/mL (1,2). The Endocrine Society went on to further define vitamin D  insufficiency as a level between 21 and 29 ng/mL (2). 1. IOM ([place] of [employer]). 2010. Dietary reference     intakes for calcium and D. [facility]: The     [employer].   2. Mateo Thomas [place], Duarte BENJAMIN, et al.     Evaluation, treatment, and prevention of vitamin D     deficiency: an Endocrine Society clinical practice     guideline. JCEM. 2011 Jul; 96(8):1911-30.  WBC 08/13/2019 6.4  3.4 - 10.8 x10E3/uL Final    RBC 08/13/2019 4.99  4.14 - 5.80 x10E6/uL Final    HGB 08/13/2019 15.1  13.0 - 17.7 g/dL Final    HCT 08/13/2019 47.6  37.5 - 51.0 % Final    MCV 08/13/2019 95  79 - 97 fL Final    MCH 08/13/2019 30.3  26.6 - 33.0 pg Final    MCHC 08/13/2019 31.7  31.5 - 35.7 g/dL Final    RDW 08/13/2019 13.8  12.3 - 15.4 % Final    PLATELET 58/75/4245 507  150 - 450 x10E3/uL Final    NEUTROPHILS 08/13/2019 54  Not Estab. % Final    Lymphocytes 08/13/2019 33  Not Estab. % Final    MONOCYTES 08/13/2019 11  Not Estab. % Final    EOSINOPHILS 08/13/2019 1  Not Estab. % Final    BASOPHILS 08/13/2019 1  Not Estab. % Final    ABS. NEUTROPHILS 08/13/2019 3.5  1.4 - 7.0 x10E3/uL Final    Abs Lymphocytes 08/13/2019 2.1  0.7 - 3.1 x10E3/uL Final    ABS. MONOCYTES 08/13/2019 0.7  0.1 - 0.9 x10E3/uL Final    ABS. EOSINOPHILS 08/13/2019 0.1  0.0 - 0.4 x10E3/uL Final    ABS. BASOPHILS 08/13/2019 0.0  0.0 - 0.2 x10E3/uL Final    IMMATURE GRANULOCYTES 08/13/2019 0  Not Estab. % Final    ABS. IMM.  GRANS. 08/13/2019 0.0  0.0 - 0.1 x10E3/uL Final    Glucose 08/13/2019 97  65 - 99 mg/dL Final    BUN 08/13/2019 14  8 - 27 mg/dL Final    Creatinine 08/13/2019 1.02  0.76 - 1.27 mg/dL Final    GFR est non-AA 08/13/2019 72  >59 mL/min/1.73 Final    GFR est AA 08/13/2019 83  >59 mL/min/1.73 Final    BUN/Creatinine ratio 08/13/2019 14  10 - 24 Final    Sodium 08/13/2019 143  134 - 144 mmol/L Final    Potassium 08/13/2019 4.3  3.5 - 5.2 mmol/L Final    Chloride 08/13/2019 104  96 - 106 mmol/L Final    CO2 08/13/2019 24  20 - 29 mmol/L Final    Calcium 08/13/2019 9.6  8.6 - 10.2 mg/dL Final    Protein, total 08/13/2019 7.0  6.0 - 8.5 g/dL Final    Albumin 08/13/2019 4.0  3.5 - 4.8 g/dL Final  GLOBULIN, TOTAL 08/13/2019 3.0  1.5 - 4.5 g/dL Final    A-G Ratio 08/13/2019 1.3  1.2 - 2.2 Final    Bilirubin, total 08/13/2019 0.9  0.0 - 1.2 mg/dL Final    Alk. phosphatase 08/13/2019 89  39 - 117 IU/L Final    AST (SGOT) 08/13/2019 14  0 - 40 IU/L Final    ALT (SGPT) 08/13/2019 11  0 - 44 IU/L Final    INTERPRETATION 08/13/2019 Note   Final    Supplemental report is available. Bobbi Armstrong MD  [facility]  03/02/20 1:43 PM    I have discussed the diagnosis with the patient and the intended treatment plan as seen in the above orders. The patient has received an after-visit summary and questions were answered concerning future plans. Asked to return should symptoms worsen or not improve with treatment. Any pending labs and studies will be relayed to patient when they become available. Pt verbalizes understanding of plan of care and denies further questions or concerns at this time. Follow-up and Dispositions    · Return in about 3 months (around 6/2/2020), or if symptoms worsen or fail to improve. Patient Instructions          Atrial Fibrillation: Care Instructions  Your Care Instructions    Atrial fibrillation is an irregular and often fast heartbeat. Treating this condition is important for several reasons. It can cause blood clots, which can travel from your heart to your brain and cause a stroke. If you have a fast heartbeat, you may feel lightheaded, dizzy, and weak. An irregular heartbeat can also increase your risk for heart failure. Atrial fibrillation is often the result of another heart condition, such as high blood pressure or coronary artery disease. Making changes to improve your heart condition will help you stay healthy and active. Follow-up care is a key part of your treatment and safety. Be sure to make and go to all appointments, and call your doctor if you are having problems.  It's also a good idea to know your test results and keep a list of the medicines you take. How can you care for yourself at home? Medicines    · Take your medicines exactly as prescribed. Call your doctor if you think you are having a problem with your medicine. You will get more details on the specific medicines your doctor prescribes.     · If your doctor has given you a blood thinner to prevent a stroke, be sure you get instructions about how to take your medicine safely. Blood thinners can cause serious bleeding problems.     · Do not take any vitamins, over-the-counter drugs, or herbal products without talking to your doctor first.    Lifestyle changes    · Do not smoke. Smoking can increase your chance of a stroke and heart attack. If you need help quitting, talk to your doctor about stop-smoking programs and medicines. These can increase your chances of quitting for good.     · Eat a heart-healthy diet.     · Stay at a healthy weight. Lose weight if you need to.     · Limit alcohol to 2 drinks a day for men and 1 drink a day for women. Too much alcohol can cause health problems.     · Avoid colds and flu. Get a pneumococcal vaccine shot. If you have had one before, ask your doctor whether you need another dose. Get a flu shot every year. If you must be around people with colds or flu, wash your hands often. Activity    · If your doctor recommends it, get more exercise. Walking is a good choice. Bit by bit, increase the amount you walk every day. Try for at least 30 minutes on most days of the week. You also may want to swim, bike, or do other activities. Your doctor may suggest that you join a cardiac rehabilitation program so that you can have help increasing your physical activity safely.     · Start light exercise if your doctor says it is okay. Even a small amount will help you get stronger, have more energy, and manage stress. Walking is an easy way to get exercise.  Start out by walking a little more than you did in the hospital. Gradually increase the amount you walk.     · When you exercise, watch for signs that your heart is working too hard. You are pushing too hard if you cannot talk while you are exercising. If you become short of breath or dizzy or have chest pain, sit down and rest immediately.     · Check your pulse regularly. Place two fingers on the artery at the palm side of your wrist, in line with your thumb. If your heartbeat seems uneven or fast, talk to your doctor. When should you call for help? Call 911 anytime you think you may need emergency care. For example, call if:    · You have symptoms of a heart attack. These may include:  ? Chest pain or pressure, or a strange feeling in the chest.  ? Sweating. ? Shortness of breath. ? Nausea or vomiting. ? Pain, pressure, or a strange feeling in the back, neck, jaw, or upper belly or in one or both shoulders or arms. ? Lightheadedness or sudden weakness. ? A fast or irregular heartbeat. After you call 911, the  may tell you to chew 1 adult-strength or 2 to 4 low-dose aspirin. Wait for an ambulance. Do not try to drive yourself.     · You have symptoms of a stroke. These may include:  ? Sudden numbness, tingling, weakness, or loss of movement in your face, arm, or leg, especially on only one side of your body. ? Sudden vision changes. ? Sudden trouble speaking. ? Sudden confusion or trouble understanding simple statements. ? Sudden problems with walking or balance. ? A sudden, severe headache that is different from past headaches.     · You passed out (lost consciousness).    Call your doctor now or seek immediate medical care if:    · You have new or increased shortness of breath.     · You feel dizzy or lightheaded, or you feel like you may faint.     · Your heart rate becomes irregular.     · You can feel your heart flutter in your chest or skip heartbeats.  Tell your doctor if these symptoms are new or worse.    Watch closely for changes in your health, and be sure to contact your doctor if you have any problems. Where can you learn more? Go to http://allie-valdemar.info/. Enter U020 in the search box to learn more about \"Atrial Fibrillation: Care Instructions. \"  Current as of: April 9, 2019  Content Version: 12.2  © 1297-3992 [employer], [employer]. Care instructions adapted under license by [employer] (which disclaims liability or warranty for this information). If you have questions about a medical condition or this instruction, always ask your healthcare professional. [address] any warranty or liability for your use of this information.

## 2020-03-02 NOTE — PROGRESS NOTES
Identified pt with two pt identifiers(name and ). Chief Complaint   Patient presents with    Hypertension    Labs        Health Maintenance Due   Topic    Shingrix Vaccine Age 50> (1 of 2)    FOBT Q1Y Age 54-65     GLAUCOMA SCREENING Q2Y     Pneumococcal 65+ years (2 of 2 - PPSV23)       Wt Readings from Last 3 Encounters:   20 219 lb (99.3 kg)   02/10/20 220 lb (99.8 kg)   20 229 lb (103.9 kg)     Temp Readings from Last 3 Encounters:   20 98.1 °F (36.7 °C) (Oral)   02/10/20 97.7 °F (36.5 °C) (Oral)   20 97.7 °F (36.5 °C) (Oral)     BP Readings from Last 3 Encounters:   20 158/83   02/10/20 142/80   20 140/70     Pulse Readings from Last 3 Encounters:   20 63   02/10/20 75   20 (!) 55         Learning Assessment:  :     Learning Assessment 2016 2016 3/21/2016   PRIMARY LEARNER Patient Patient Patient   HIGHEST LEVEL OF EDUCATION - PRIMARY LEARNER  - - 4 YEARS OF COLLEGE   BARRIERS PRIMARY LEARNER - NONE NONE   CO-LEARNER CAREGIVER - No No   PRIMARY LANGUAGE ENGLISH ENGLISH ENGLISH   LEARNER PREFERENCE PRIMARY DEMONSTRATION DEMONSTRATION DEMONSTRATION     - - READING     - - LISTENING   LEARNING SPECIAL TOPICS no no -   ANSWERED BY patient patient patient   RELATIONSHIP SELF SELF SELF   ASSESSMENT COMMENT none none -       Depression Screening:  :     3 most recent PHQ Screens 2020   Little interest or pleasure in doing things Not at all   Feeling down, depressed, irritable, or hopeless Not at all   Total Score PHQ 2 0       Fall Risk Assessment:  :     Fall Risk Assessment, last 12 mths 2020   Able to walk? Yes   Fall in past 12 months? No   Fall with injury? -   Number of falls in past 12 months -   Fall Risk Score -       Abuse Screening:  :     Abuse Screening Questionnaire 3/2/2020 2019 2017   Do you ever feel afraid of your partner? N N N   Are you in a relationship with someone who physically or mentally threatens you?  Benedicto Rodriguez N   Is it safe for you to go home? Y Y Y       Coordination of Care Questionnaire:  :     1) Have you been to an emergency room, urgent care clinic since your last visit? no   Hospitalized since your last visit? no             2) Have you seen or consulted any other health care providers outside of [address] [place] [address] since your last visit? no  (Include any pap smears or colon screenings in this section.)    3) Do you have an Advance Directive on file? no  Are you interested in receiving information about Advance Directives? no    Reviewed record in preparation for visit and have obtained necessary documentation. Medication reconciliation up to date and corrected with patient at this time.

## 2020-03-02 NOTE — PATIENT INSTRUCTIONS

## 2020-03-06 ENCOUNTER — TELEPHONE (OUTPATIENT)
Dept: CARDIOLOGY CLINIC | Age: 75
End: 2020-03-06

## 2020-03-06 LAB — HEMOCCULT STL QL IA: NEGATIVE

## 2020-03-06 NOTE — PROGRESS NOTES
Please let patient know that his thyroid function remains normal at this time. Will await his new cardiology referral report.

## 2020-03-06 NOTE — TELEPHONE ENCOUNTER
Please call and schedule patient as New Patient for Dr. Sandeep Elmore. DX: PAF    Diagnoses:    I48.0 (ICD-10-CM) - Paroxysmal atrial fibrillation ([facility] [address]  REFERRAL TO CARDIOLOGY [[id number]]  75M with h/o Paroxysmal A.fib. Over the past 3-months, he has needed to be cardioverted x 2. Once in [place] and once in [facility]. Previous cardiologist in [place] was Dr. Wallace Hagen. Also has a cardiologist in [place] - Dr. Jillian Herron. First episode October 2019. Was in Afib. Saw Dr. Jillian Herron. Cardioverted after being placed on Eliquis. EKG and ECHO done at that time were reportedly normal. Returned from [place] in on Feb 4, had episode of odd sensation from feet coming up to abdomen felt profoundly sick with vomiting and noted to be in A.fib once again. This time, EKG was done and Dr. Wallace Hagen had him come back on 2/10/2020 for cardioversion once again. No change in medications.  However, while he remains in NSR at this time (he monitors with watch and has an niki) he is still concerned about possible future events.

## 2020-03-07 LAB — REQUEST PROBLEM, 015255: NORMAL

## 2020-03-31 DIAGNOSIS — I10 ESSENTIAL HYPERTENSION, BENIGN: ICD-10-CM

## 2020-03-31 RX ORDER — TERAZOSIN 2 MG/1
CAPSULE ORAL
Qty: 90 CAP | Refills: 0 | Status: SHIPPED | OUTPATIENT
Start: 2020-03-31 | End: 2020-04-01 | Stop reason: SDUPTHER

## 2020-04-01 DIAGNOSIS — I10 ESSENTIAL HYPERTENSION, BENIGN: ICD-10-CM

## 2020-04-01 RX ORDER — TERAZOSIN 2 MG/1
CAPSULE ORAL
Qty: 90 CAP | Refills: 0 | Status: SHIPPED | OUTPATIENT
Start: 2020-04-01 | End: 2020-07-20 | Stop reason: SDUPTHER

## 2020-04-01 NOTE — TELEPHONE ENCOUNTER
Pt called and is stuck in [place] currently. He is asking for a refill. I've updated the pharmacy to his preferred one at this time.

## 2020-04-20 ENCOUNTER — TELEPHONE (OUTPATIENT)
Dept: CARDIOLOGY CLINIC | Age: 75
End: 2020-04-20

## 2020-04-20 NOTE — TELEPHONE ENCOUNTER
Attempted to call all patient numbers multiple times concerning upcoming visit. LVM to switch to VV.      Need to switch patient to VV on 4/22/20 at 3:40PM.

## 2020-05-04 ENCOUNTER — OFFICE VISIT (OUTPATIENT)
Dept: DERMATOLOGY | Facility: AMBULATORY SURGERY CENTER | Age: 75
End: 2020-05-04

## 2020-05-04 VITALS
HEART RATE: 58 BPM | WEIGHT: 219 LBS | OXYGEN SATURATION: 97 % | HEIGHT: 73 IN | TEMPERATURE: 98.2 F | BODY MASS INDEX: 29.03 KG/M2

## 2020-05-04 DIAGNOSIS — Z85.820 PERSONAL HISTORY OF MALIGNANT MELANOMA OF SKIN: ICD-10-CM

## 2020-05-04 DIAGNOSIS — D18.01 CHERRY ANGIOMA: ICD-10-CM

## 2020-05-04 DIAGNOSIS — L81.4 LENTIGINES: Primary | ICD-10-CM

## 2020-05-04 DIAGNOSIS — Z83.3 FAMILY HISTORY OF BORDERLINE DIABETES MELLITUS: ICD-10-CM

## 2020-05-04 DIAGNOSIS — D22.9 MULTIPLE BENIGN NEVI: ICD-10-CM

## 2020-05-04 DIAGNOSIS — L82.1 SEBORRHEIC KERATOSES: ICD-10-CM

## 2020-05-04 RX ORDER — FLECAINIDE ACETATE 100 MG/1
TABLET ORAL
COMMUNITY
Start: 2020-03-15

## 2020-05-04 RX ORDER — METOPROLOL TARTRATE 50 MG/1
TABLET ORAL
COMMUNITY
Start: 2020-03-30

## 2020-05-04 NOTE — PROGRESS NOTES
Name: Dc Smoker       Age: 76 y.o. Date: 5/4/2020    Chief Complaint:   Chief Complaint   Patient presents with    Skin Exam     full body       Subjective:    HPI  Mr. Irwin Smoker is a 76 y.o. male who presents for a full skin exam.  The patient's last skin exam was on 1/9/2020 and the patient does not have current complaints related to his skin. He states he continues to use his wide brimmed hat and sun shirt when riding the tractor and sometimes with walking. The patient's pertinent skin history includes : Personal history of MM, [place]. Family history of MM in his brother, which he passed from  -MM, left upper arm, excision on 8/23/16 Stage 1 a, breslow depth of 0.55 mm  -MM, upper back, excision on 4/15/19 with breslow depth of 0.58 mm, stage 1a    ROS: Constitutional: Negative.     Dermatological : negative      Social History     Socioeconomic History    Marital status:      Spouse name: Not on file    Number of children: Not on file    Years of education: Not on file    Highest education level: Not on file   Occupational History    Not on file   Social Needs    Financial resource strain: Not on file    Food insecurity     Worry: Not on file     Inability: Not on file    Transportation needs     Medical: Not on file     Non-medical: Not on file   Tobacco Use    Smoking status: Never Smoker    Smokeless tobacco: Never Used   Substance and Sexual Activity    Alcohol use: No     Alcohol/week: 0.0 standard drinks    Drug use: Never    Sexual activity: Not Currently   Lifestyle    Physical activity     Days per week: Not on file     Minutes per session: Not on file    Stress: Not on file   Relationships    Social connections     Talks on phone: Not on file     Gets together: Not on file     Attends Scientologist service: Not on file     Active member of club or organization: Not on file     Attends meetings of clubs or organizations: Not on file     Relationship status: Not on file    Intimate partner violence     Fear of current or ex partner: Not on file     Emotionally abused: Not on file     Physically abused: Not on file     Forced sexual activity: Not on file   Other Topics Concern    Not on file   Social History Narrative    Not on file       Family History   Problem Relation Age of Onset    Other Mother         hypotension    Heart Failure Father     Cancer Father         Prostate    Other Father         PUD    Heart Disease Father         Heart Valve issue and replacement    Cancer Brother         Melanoma -  at 39yrs old   [facility] Cancer Sister         Hodgkin's in one, melanoma in other    Cancer Paternal Uncle         Prostate    Cancer Maternal Grandfather         Lung       Past Medical History:   Diagnosis Date    Atrial fibrillation ([address]     Family history of skin cancer     brother-melanoma    Hyperlipidemia     Hypertension     Sun-damaged skin        Past Surgical History:   Procedure Laterality Date    COLONOSCOPY N/A 2019    COLONOSCOPY performed by Deny Driscoll MD at [facility] [place] ENDOSCOPY    HX TONSILLECTOMY      at 10years old       Current Outpatient Medications   Medication Sig Dispense Refill    metoprolol tartrate (LOPRESSOR) 50 mg tablet TAKE 1 TABLET BY MOUTH TWICE A DAY WITH FOOD      flecainide (TAMBOCOR) 100 mg tablet TAKE 1 TABLET BY MOUTH EVERY 12 HOURS      terazosin (HYTRIN) 2 mg capsule TAKE 1 CAPSULE BY MOUTH EVERYDAY AT BEDTIME 90 Cap 0    losartan (COZAAR) 100 mg tablet Take 1 Tab by mouth once over twenty-four (24) hours.  aspirin delayed-release 81 mg tablet Take 81 mg by mouth once over twenty-four (24) hours.  ELIQUIS 5 mg tablet       carvediloL (COREG) 25 mg tablet Take 25 mg by mouth two (2) times a day.  pneumococcal 23-valent (PNEUMOVAX 23) 25 mcg/0.5 mL injection 0.5 mL by IntraMUSCular route PRIOR TO DISCHARGE for 1 dose.  0.5 mL 0    spironolactone (ALDACTONE) 25 mg tablet          Allergies   Allergen Reactions    Codeine Other (comments)     Bad dreams         Objective:    Visit Vitals  Pulse (!) 58   Temp 98.2 °F (36.8 °C) (Oral)   Ht 6' 1\" (1.854 m)   Wt 99.3 kg (219 lb)   SpO2 97%   BMI 28.89 kg/m²       Jordan Quintanilla is a 76 y.o. male who appears well and in no distress. He is awake, alert, and oriented. There is no preauricular, submandibular, or cervical lymphadenopathy. A skin examination was performed including his scalp, face (including eyelids), ears, neck, chest, back, abdomen, upper extremities (including digits/nails), lower extremities, breasts, buttocks; genital skin was not examined. He has well healed melanoma scars on the left upper arm and upper back without pigment, nodularity, or other evidence of lesion recurrence. No associated palpable right or left axillary lymphadenopathy. There other scars on his back as well. There are lentigines on sun exposed areas. He has scattered waxy macules and keratotic papules consistent with seborrheic keratoses. He has pink intradermal nevi and brown junctional nevi, no concerning features for severe atypia. He has scattered red and violaceous cherry angiomas.      Assessment/Plan:  1. Personal history of melanoma skin cancer. I discussed sun protection, sunscreen use, the warning signs of skin cancer, the need for self-skin examinations, and the need for regular practitioner exams. The patient should follow up sooner as needed if new, changing, or symptomatic skin lesions arise. I will send the recent melanoma tumor for the [facility] Dx testing.     2. Solar lentigos. The diagnosis and relationship to sun exposure was reviewed. Sun protection advised.     3. Seborrheic keratoses. The diagnosis was reviewed and the patient was reassured that no treatment is needed for these benign lesions.     4. Normal nevi. The diagnosis of normal nevi was reviewed.   I discussed sun protection, sunscreen use, the warning signs of skin cancer, mole monitoring, the need for self-skin examinations, and the need for regular practitioner exams. The patient should follow up sooner as needed if new, changing, or symptomatic skin lesions arise.     5. Family history of skin cancer. I discussed sun protection, sunscreen use, the warning signs of skin cancer, the need for self-skin examinations, and the need for regular practitioner exams. The patient should follow up sooner as needed if new, changing, or symptomatic skin lesions arise. 6.Cherry angiomas. The diagnosis was reviewed and the patient was reassured that no treatment is needed for these benign lesions.             Next skin exam:  3 months - discussed closure of practice and will need transition appt.

## 2020-07-20 DIAGNOSIS — I10 ESSENTIAL HYPERTENSION, BENIGN: ICD-10-CM

## 2020-07-20 RX ORDER — TERAZOSIN 2 MG/1
CAPSULE ORAL
Qty: 90 CAP | Refills: 0 | Status: SHIPPED | OUTPATIENT
Start: 2020-07-20 | End: 2021-01-04 | Stop reason: SDUPTHER

## 2020-12-02 NOTE — PROGRESS NOTES
CC:  Chief Complaint   Patient presents with    Follow Up Chronic Condition     Follow up for high blood pressures       Subjective:     Omayra Patrick is a 68 y.o. male who presents for follow up of hypertension. Recently, his BP has been elevated despite being on Benicar and Carvedilol. Also, he reports that he was seen by urology and they felt that while his PSA numbers jumped, he was not considered high risk. He does have BPH, but no specific treatment was discussed or given. Diet and Lifestyle: generally follows a low fat low cholesterol diet, generally follows a low sodium diet  Home BP Monitoring: is not measured at home    Cardiovascular ROS: taking medications as instructed, no medication side effects noted, no TIA's, no chest pain on exertion, no dyspnea on exertion, no swelling of ankles. New concerns:   As above. Patient Active Problem List    Diagnosis Date Noted    Paroxysmal atrial fibrillation ([facility] [address] 07/12/2018    Melanoma of left upper arm ([facility] 08/23/2016    Elevated TSH 06/02/2016     Current Outpatient Prescriptions   Medication Sig Dispense Refill    terazosin (HYTRIN) 2 mg capsule Take 1 Cap by mouth nightly for 30 days. 30 Cap 3    PREVNAR 13, PF, 0.5 mL syrg injection TO BE ADMINISTERED BY PHARMACIST FOR IMMUNIZATION  0    BENICAR 40 mg tablet Take 40 mg by mouth once over twenty-four (24) hours.  carvedilol (COREG) 6.25 mg tablet Take 6.25 mg by mouth two (2) times a day. 4    aspirin delayed-release 81 mg tablet Take 81 mg by mouth daily.        Allergies   Allergen Reactions    Codeine Other (comments)     Bad dreams     Past Medical History:   Diagnosis Date    Family history of skin cancer     brother-melanoma    Hyperlipidemia     Hypertension     Sun-damaged skin     Thyroid disorder      Past Surgical History:   Procedure Laterality Date    HX TONSILLECTOMY      at 10years old     Family History   Problem Relation Age of Onset    Other Mother hypotension    Heart Failure Father     Cancer Father      Prostate    Other Father      PUD    Heart Disease Father      Heart Valve issue and replacement    Cancer Brother      Melanoma -  at 39yrs old   [facility] Cancer Sister      Hodgkin's in one, melanoma in other    Cancer Paternal Uncle      Prostate    Cancer Maternal Grandfather      Lung     Social History   Substance Use Topics    Smoking status: Never Smoker    Smokeless tobacco: Never Used    Alcohol use No         Review of Systems, additional:  Pertinent items are noted in HPI. Objective:     Visit Vitals    BP (!) 166/99 (BP 1 Location: Left arm, BP Patient Position: Sitting)    Pulse 64    Temp 97.7 °F (36.5 °C) (Oral)    Resp 20    Ht 6' 1\" (1.854 m)    Wt 220 lb 9.6 oz (100.1 kg)    SpO2 97%    BMI 29.1 kg/m2     Appearance: alert, well appearing, and in no distress and overweight. General exam: CVS exam BP noted to be moderately elevated today in office, S1, S2 normal, no gallop, no murmur, chest clear, no JVD, no HSM, no edema, peripheral vascular exam both carotids normal upstroke without bruits, neurological exam alert, oriented, normal speech, no focal findings or movement disorder noted. Lab review: orders written for new lab studies as appropriate; see orders. Assessment/Plan:         ICD-10-CM ICD-9-CM    1. Essential hypertension, benign I10 401.1 terazosin (HYTRIN) 2 mg capsule     We reviewed his HTN and BP is still quite elevated. In addition, recent prostate evaluation revealed an enlarged prostate. To help with both, I will start him on Hytrin and this should also help his BP and possible mild BPH as well. I have discussed the diagnosis with the patient and the intended treatment plan as seen in the above orders. The patient has received an after-visit summary and questions were answered concerning future plans. Asked to return should symptoms worsen or not improve with treatment.  Any pending labs and studies will be relayed to patient when they become available. Pt verbalizes understanding of plan of care and denies further questions or concerns at this time. Follow-up Disposition:  Return in about 1 month (around 9/13/2018), or if symptoms worsen or fail to improve, for recheck BP. Chely Kaur Enbrel Counseling:  I discussed with the patient the risks of etanercept including but not limited to myelosuppression, immunosuppression, autoimmune hepatitis, demyelinating diseases, lymphoma, and infections.  The patient understands that monitoring is required including a PPD at baseline and must alert us or the primary physician if symptoms of infection or other concerning signs are noted.

## 2020-12-08 ENCOUNTER — TELEPHONE (OUTPATIENT)
Dept: FAMILY MEDICINE CLINIC | Age: 75
End: 2020-12-08

## 2020-12-08 NOTE — TELEPHONE ENCOUNTER
----- Message from Ronit Stock sent at 12/8/2020  9:18 AM EST -----  Regarding: Dr. Marcelina Jones / Wolf Feldman first and last name: Claudia Bond, Coordinator Assistant   Reason for call: Caller requested pt's medical record on 12/1/2020 via fax; caller is looking for verification that it has been received and is being processed.    Callback required yes/no and why: yes, to confirm   Best contact number(s): [phone]  Details to clarify the request: N/A

## 2021-01-04 DIAGNOSIS — I10 ESSENTIAL HYPERTENSION, BENIGN: ICD-10-CM

## 2021-01-04 RX ORDER — TERAZOSIN 2 MG/1
CAPSULE ORAL
Qty: 90 CAP | Refills: 0 | Status: SHIPPED | OUTPATIENT
Start: 2021-01-04 | End: 2021-03-15

## 2021-01-04 NOTE — TELEPHONE ENCOUNTER
----- Message from Nato Soto sent at 1/4/2021  9:41 AM EST -----  Regarding: Dr. Ruma Guaman Refill  Contact: [phone]  Medication Refill    Caller (if not patient):pt      Relationship of caller (if not patient):pt      Best contact number(s):[phone]      Name of medication and dosage if known: Terazosin 2 mg cap      Is patient out of this medication (yes/no):no but has zero refills left      Pharmacy name:[id number] in [address] [place] [address],[address] listed in chart? (yes/no): yes  Pharmacy phone number: [phone]      Details to clarify the request: pt is in [place] right now      Nato Soto

## 2021-03-15 DIAGNOSIS — I10 ESSENTIAL HYPERTENSION, BENIGN: ICD-10-CM

## 2021-03-15 RX ORDER — TERAZOSIN 2 MG/1
CAPSULE ORAL
Qty: 90 CAP | Refills: 0 | Status: SHIPPED | OUTPATIENT
Start: 2021-03-15 | End: 2021-06-30 | Stop reason: SDUPTHER

## 2021-05-03 ENCOUNTER — OFFICE VISIT (OUTPATIENT)
Dept: FAMILY MEDICINE CLINIC | Age: 76
End: 2021-05-03
Payer: MEDICARE

## 2021-05-03 VITALS
TEMPERATURE: 97.7 F | BODY MASS INDEX: 27.7 KG/M2 | HEART RATE: 60 BPM | SYSTOLIC BLOOD PRESSURE: 138 MMHG | HEIGHT: 73 IN | RESPIRATION RATE: 20 BRPM | WEIGHT: 209 LBS | DIASTOLIC BLOOD PRESSURE: 76 MMHG | OXYGEN SATURATION: 98 %

## 2021-05-03 DIAGNOSIS — I10 ESSENTIAL HYPERTENSION, BENIGN: ICD-10-CM

## 2021-05-03 DIAGNOSIS — E55.9 VITAMIN D DEFICIENCY: ICD-10-CM

## 2021-05-03 DIAGNOSIS — E78.2 MIXED HYPERLIPIDEMIA: ICD-10-CM

## 2021-05-03 DIAGNOSIS — Z23 ENCOUNTER FOR IMMUNIZATION: ICD-10-CM

## 2021-05-03 DIAGNOSIS — Z12.5 SCREENING FOR PROSTATE CANCER: ICD-10-CM

## 2021-05-03 DIAGNOSIS — Z00.00 MEDICARE ANNUAL WELLNESS VISIT, SUBSEQUENT: Primary | ICD-10-CM

## 2021-05-03 DIAGNOSIS — Z86.39 HISTORY OF HYPOTHYROIDISM: ICD-10-CM

## 2021-05-03 DIAGNOSIS — I48.0 PAROXYSMAL ATRIAL FIBRILLATION (HCC): ICD-10-CM

## 2021-05-03 PROCEDURE — G0439 PPPS, SUBSEQ VISIT: HCPCS | Performed by: INTERNAL MEDICINE

## 2021-05-03 PROCEDURE — G0463 HOSPITAL OUTPT CLINIC VISIT: HCPCS | Performed by: INTERNAL MEDICINE

## 2021-05-03 PROCEDURE — G8427 DOCREV CUR MEDS BY ELIG CLIN: HCPCS | Performed by: INTERNAL MEDICINE

## 2021-05-03 PROCEDURE — G8510 SCR DEP NEG, NO PLAN REQD: HCPCS | Performed by: INTERNAL MEDICINE

## 2021-05-03 PROCEDURE — G8752 SYS BP LESS 140: HCPCS | Performed by: INTERNAL MEDICINE

## 2021-05-03 PROCEDURE — 99214 OFFICE O/P EST MOD 30 MIN: CPT | Performed by: INTERNAL MEDICINE

## 2021-05-03 PROCEDURE — 90732 PPSV23 VACC 2 YRS+ SUBQ/IM: CPT | Performed by: INTERNAL MEDICINE

## 2021-05-03 PROCEDURE — G8754 DIAS BP LESS 90: HCPCS | Performed by: INTERNAL MEDICINE

## 2021-05-03 PROCEDURE — 1101F PT FALLS ASSESS-DOCD LE1/YR: CPT | Performed by: INTERNAL MEDICINE

## 2021-05-03 PROCEDURE — G8536 NO DOC ELDER MAL SCRN: HCPCS | Performed by: INTERNAL MEDICINE

## 2021-05-03 PROCEDURE — G8419 CALC BMI OUT NRM PARAM NOF/U: HCPCS | Performed by: INTERNAL MEDICINE

## 2021-05-03 RX ORDER — ZOSTER VACCINE RECOMBINANT, ADJUVANTED 50 MCG/0.5
0.5 KIT INTRAMUSCULAR ONCE
Qty: 0.5 ML | Refills: 0 | Status: SHIPPED | OUTPATIENT
Start: 2021-05-03 | End: 2021-05-03

## 2021-05-03 NOTE — PROGRESS NOTES
CC:  Chief Complaint   Patient presents with    Annual Wellness Visit    Labs     fasting       This is the Subsequent Medicare Annual Wellness Exam, performed 12 months or more after the Initial AWV or the last Subsequent AWV    I have reviewed the patient's medical history in detail and updated the computerized patient record. Very pleasant 76M who presents for his AWV. He has been spending most of his time in [place] with a plan to move there to be closer to his family. He reports that in 6/2020 he had a stroke in [place]. I have asked him to have information sent to us. He did not recall the hospital system, but has the information at home. He has no permanent deficits, but initially had some difficulty with speech, memory and vision - which have all returned, but he does notice still that he is not as quick with math and recall as he use to be, but this is barely noticeable. He is now on Eliquis and tolerating this well without any excessive bleeding. He is in the process of selling his home and will more than likely move down to [facility] permanently. He has 2-older children and grandchildren in the area. Assessment/Plan   Education and counseling provided:  Are appropriate based on today's review and evaluation  End-of-Life planning (with patient's consent)  Pneumococcal Vaccine  Colorectal cancer screening tests  Cardiovascular screening blood test  Bone mass measurement (DEXA)  Screening for glaucoma    Diagnoses and all orders for this visit:    1. Medicare annual wellness visit, subsequent   Anticipatory guidance discussed. Immunizations reviewed    He did have COVID-19 vaccine- Moderna part 1 and 2. HM updated. 2. Paroxysmal atrial fibrillation (HCC)   Stable. Currently in NSR. This is followed by a specialist.     3. Essential hypertension, benign  -     METABOLIC PANEL, COMPREHENSIVE; Future    4. Vitamin D deficiency  -     VITAMIN D, 25 HYDROXY; Future    5.  Mixed hyperlipidemia  -     METABOLIC PANEL, COMPREHENSIVE; Future  -     CBC WITH AUTOMATED DIFF; Future  -     LIPID PANEL; Future    6. Screening for prostate cancer  -     PSA SCREENING (SCREENING); Future    7. History of hypothyroidism  -     TSH 3RD GENERATION; Future  -     T3 UPTAKE PROFILE; Future  -     T4 (THYROXINE); Future    8. Encounter for immunization  -     PNEUMOCOCCAL POLYSACCHARIDE VACCINE, 23-VALENT, ADULT OR IMMUNOSUPPRESSED PT DOSE,  -     ADMIN PNEUMOCOCCAL VACCINE    Other orders  -     varicella-zoster recombinant, PF, (Shingrix, PF,) 50 mcg/0.5 mL susr injection; 0.5 mL by IntraMUSCular route once for 1 dose. I have discussed the diagnosis with the patient and the intended treatment plan as seen in the above orders. The patient has received an after-visit summary and questions were answered concerning future plans. Asked to return should symptoms worsen or not improve with treatment. Any pending labs and studies will be relayed to patient when they become available. Pt verbalizes understanding of plan of care and denies further questions or concerns at this time. Follow-up and Dispositions    · Return in about 1 year (around 5/3/2022), or if symptoms worsen or fail to improve, for Follow up 6 months for chronic issues. Depression Risk Factor Screening     3 most recent PHQ Screens 5/3/2021   Little interest or pleasure in doing things Not at all   Feeling down, depressed, irritable, or hopeless Not at all   Total Score PHQ 2 0       Alcohol Risk Screen    Do you average more than 1 drink per night or more than 7 drinks a week: No  In the past three months have you have had more than 4 drinks containing alcohol on one occasion: No      Functional Ability and Level of Safety    Hearing: The patient needs further evaluation. He plans to get this done in [place]. Activities of Daily Living:   The home contains: handrails and grab bars  Patient does total self care Ambulation: with no difficulty     Fall Risk:  Fall Risk Assessment, last 12 mths 5/3/2021   Able to walk? Yes   Fall in past 12 months? 0   Do you feel unsteady? 0   Are you worried about falling 0   Number of falls in past 12 months -   Fall with injury? -      Abuse Screen:  Patient is not abused       Cognitive Screening    Has your family/caregiver stated any concerns about your memory: yes - patient feels like his memory has declined a little since his stroke, but it is not terrible. Cognitive Screening: Normal - Clock Drawing Test, Mini Cog Test    Health Maintenance Due     Health Maintenance Due   Topic Date Due    COVID-19 Vaccine (1) Never done    Shingrix Vaccine Age 50> (1 of 2) Never done    Pneumococcal 65+ years (2 of 2 - PPSV23) 04/16/2019       Patient Care Team   Patient Care Team:  Merrick Cain MD as PCP - General (Pediatric Medicine)  Merrick Cain MD as PCP - [facility] Empaneled Provider    History     Patient Active Problem List   Diagnosis Code    Elevated TSH R79.89    Melanoma of left upper arm (HCC) C43.62    Paroxysmal atrial fibrillation (HCC) I48.0     Past Medical History:   Diagnosis Date    Atrial fibrillation ([facility] [address]     Family history of skin cancer     brother-melanoma    Hyperlipidemia     Hypertension     Sun-damaged skin       Past Surgical History:   Procedure Laterality Date    COLONOSCOPY N/A 2/4/2019    [facility]    HX TONSILLECTOMY      at 10years old     Current Outpatient Medications   Medication Sig Dispense Refill    terazosin (HYTRIN) 2 mg capsule TAKE 1 CAPSULE BY MOUTH EVERYDAY AT BEDTIME 90 Cap 0    flecainide (TAMBOCOR) 100 mg tablet TAKE 1 TABLET BY MOUTH EVERY 12 HOURS      losartan (COZAAR) 100 mg tablet Take 1 Tab by mouth once over twenty-four (24) hours.  carvediloL (COREG) 25 mg tablet Take 25 mg by mouth two (2) times a day.       ELIQUIS 5 mg tablet Take 5 mg by mouth two (2) times a day.  metoprolol tartrate (LOPRESSOR) 50 mg tablet TAKE 1 TABLET BY MOUTH TWICE A DAY WITH FOOD      aspirin delayed-release 81 mg tablet Take 81 mg by mouth once over twenty-four (24) hours.  pneumococcal 23-valent (PNEUMOVAX 23) 25 mcg/0.5 mL injection 0.5 mL by IntraMUSCular route PRIOR TO DISCHARGE for 1 dose. 0.5 mL 0    spironolactone (ALDACTONE) 25 mg tablet        Allergies   Allergen Reactions    Codeine Other (comments)     Bad dreams       Family History   Problem Relation Age of Onset    Other Mother         hypotension    Heart Failure Father     Cancer Father         Prostate    Other Father         PUD    Heart Disease Father         Heart Valve issue and replacement    Cancer Brother         Melanoma -  at 39yrs old   [facility] Cancer Sister         Hodgkin's in one, melanoma in other   [facility] Cancer Paternal Uncle         Prostate    Cancer Maternal Grandfather         Lung     Social History     Tobacco Use    Smoking status: Never Smoker    Smokeless tobacco: Never Used   Substance Use Topics    Alcohol use: No     Alcohol/week: 0.0 standard drinks       Barbie Dennis MD     Patient Instructions        Well Visit, Over 72: Care Instructions  Overview     Well visits can help you stay healthy. Your doctor has checked your overall health and may have suggested ways to take good care of yourself. Your doctor also may have recommended tests. At home, you can help prevent illness with healthy eating, regular exercise, and other steps. Follow-up care is a key part of your treatment and safety. Be sure to make and go to all appointments, and call your doctor if you are having problems. It's also a good idea to know your test results and keep a list of the medicines you take. How can you care for yourself at home? · Get screening tests that you and your doctor decide on. Screening helps find diseases before any symptoms appear. · Eat healthy foods.  Choose fruits, vegetables, whole grains, protein, and low-fat dairy foods. Limit fat, especially saturated fat. Reduce salt in your diet. · Limit alcohol. If you are a man, have no more than 2 drinks a day or 14 drinks a week. If you are a woman, have no more than 1 drink a day or 7 drinks a week. Since alcohol affects older adults differently, you may want to limit alcohol even more. Or you may not want to drink at all. · Get at least 30 minutes of exercise on most days of the week. Walking is a good choice. You also may want to do other activities, such as running, swimming, cycling, or playing tennis or team sports. · Reach and stay at a healthy weight. This will lower your risk for many problems, such as obesity, diabetes, heart disease, and high blood pressure. · Do not smoke. Smoking can make health problems worse. If you need help quitting, talk to your doctor about stop-smoking programs and medicines. These can increase your chances of quitting for good. · Care for your mental health. It is easy to get weighed down by worry and stress. Learn strategies to manage stress, like deep breathing and mindfulness, and stay connected with your family and community. If you find you often feel sad or hopeless, talk with your doctor. Treatment can help. · Talk to your doctor about whether you have any risk factors for sexually transmitted infections (STIs). You can help prevent STIs if you wait to have sex with a new partner (or partners) until you've each been tested for STIs. It also helps if you use condoms (male or female condoms) and if you limit your sex partners to one person who only has sex with you. Vaccines are available for some STIs. · If you think you may have a problem with alcohol or drug use, talk to your doctor. This includes prescription medicines (such as amphetamines and opioids) and illegal drugs (such as cocaine and methamphetamine).  Your doctor can help you figure out what type of treatment is best for you.  · Protect your skin from too much sun. When you're outdoors from 10 a.m. to 4 p.m., stay in the shade or cover up with clothing and a hat with a wide brim. Wear sunglasses that block UV rays. Even when it's cloudy, put broad-spectrum sunscreen (SPF 30 or higher) on any exposed skin. · See a dentist one or two times a year for checkups and to have your teeth cleaned. · Wear a seat belt in the car. When should you call for help? Watch closely for changes in your health, and be sure to contact your doctor if you have any problems or symptoms that concern you. Where can you learn more? Go to http://www.gray.com/  Enter [id number] in the search box to learn more about \"Well Visit, Over 65: Care Instructions. \"  Current as of: May 27, 2020               Content Version: 12.8  © 0413-8392 [employer]. Care instructions adapted under license by [employer] (which disclaims liability or warranty for this information). If you have questions about a medical condition or this instruction, always ask your healthcare professional. [address] any warranty or liability for your use of this information.

## 2021-05-03 NOTE — PATIENT INSTRUCTIONS
Well Visit, Over 72: Care Instructions Overview Well visits can help you stay healthy. Your doctor has checked your overall health and may have suggested ways to take good care of yourself. Your doctor also may have recommended tests. At home, you can help prevent illness with healthy eating, regular exercise, and other steps. Follow-up care is a key part of your treatment and safety. Be sure to make and go to all appointments, and call your doctor if you are having problems. It's also a good idea to know your test results and keep a list of the medicines you take. How can you care for yourself at home? · Get screening tests that you and your doctor decide on. Screening helps find diseases before any symptoms appear. · Eat healthy foods. Choose fruits, vegetables, whole grains, protein, and low-fat dairy foods. Limit fat, especially saturated fat. Reduce salt in your diet. · Limit alcohol. If you are a man, have no more than 2 drinks a day or 14 drinks a week. If you are a woman, have no more than 1 drink a day or 7 drinks a week. Since alcohol affects older adults differently, you may want to limit alcohol even more. Or you may not want to drink at all. · Get at least 30 minutes of exercise on most days of the week. Walking is a good choice. You also may want to do other activities, such as running, swimming, cycling, or playing tennis or team sports. · Reach and stay at a healthy weight. This will lower your risk for many problems, such as obesity, diabetes, heart disease, and high blood pressure. · Do not smoke. Smoking can make health problems worse. If you need help quitting, talk to your doctor about stop-smoking programs and medicines. These can increase your chances of quitting for good. · Care for your mental health. It is easy to get weighed down by worry and stress. Learn strategies to manage stress, like deep breathing and mindfulness, and stay connected with your family and community. If you find you often feel sad or hopeless, talk with your doctor. Treatment can help. · Talk to your doctor about whether you have any risk factors for sexually transmitted infections (STIs). You can help prevent STIs if you wait to have sex with a new partner (or partners) until you've each been tested for STIs. It also helps if you use condoms (male or female condoms) and if you limit your sex partners to one person who only has sex with you. Vaccines are available for some STIs. · If you think you may have a problem with alcohol or drug use, talk to your doctor. This includes prescription medicines (such as amphetamines and opioids) and illegal drugs (such as cocaine and methamphetamine). Your doctor can help you figure out what type of treatment is best for you. · Protect your skin from too much sun. When you're outdoors from 10 a.m. to 4 p.m., stay in the shade or cover up with clothing and a hat with a wide brim. Wear sunglasses that block UV rays. Even when it's cloudy, put broad-spectrum sunscreen (SPF 30 or higher) on any exposed skin. · See a dentist one or two times a year for checkups and to have your teeth cleaned. · Wear a seat belt in the car. When should you call for help? Watch closely for changes in your health, and be sure to contact your doctor if you have any problems or symptoms that concern you. Where can you learn more? Go to http://www.gray.com/ Enter B541 in the search box to learn more about \"Well Visit, Over 65: Care Instructions. \" Current as of: May 27, 2020               Content Version: 12.8 © 8158-9324 [employer]. Care instructions adapted under license by [employer] (which disclaims liability or warranty for this information).  If you have questions about a medical condition or this instruction, always ask your healthcare professional. [place] [address] any warranty or liability for your use of this information.

## 2021-05-03 NOTE — PROGRESS NOTES
Identified pt with two pt identifiers(name and ). Chief Complaint   Patient presents with    Annual Wellness Visit    Labs     fasting        Health Maintenance Due   Topic    COVID-19 Vaccine (1)    Shingrix Vaccine Age 49> (1 of 2)    Pneumococcal 65+ years (2 of 2 - PPSV23)    Medicare Yearly Exam        Wt Readings from Last 3 Encounters:   21 209 lb (94.8 kg)   20 219 lb (99.3 kg)   20 219 lb (99.3 kg)     Temp Readings from Last 3 Encounters:   21 97.7 °F (36.5 °C) (Temporal)   20 98.2 °F (36.8 °C) (Oral)   20 98.1 °F (36.7 °C) (Oral)     BP Readings from Last 3 Encounters:   21 138/76   20 158/83   02/10/20 142/80     Pulse Readings from Last 3 Encounters:   21 60   20 (!) 58   20 63         Learning Assessment:  :     Learning Assessment 2016 2016 3/21/2016   PRIMARY LEARNER Patient Patient Patient   HIGHEST LEVEL OF EDUCATION - PRIMARY LEARNER  - - 4 YEARS OF COLLEGE   BARRIERS PRIMARY LEARNER - NONE NONE   CO-LEARNER CAREGIVER - No No   PRIMARY LANGUAGE ENGLISH ENGLISH ENGLISH   LEARNER PREFERENCE PRIMARY DEMONSTRATION DEMONSTRATION DEMONSTRATION     - - READING     - - LISTENING   LEARNING SPECIAL TOPICS no no -   ANSWERED BY patient patient patient   RELATIONSHIP SELF SELF SELF   ASSESSMENT COMMENT none none -       Depression Screening:  :     3 most recent PHQ Screens 5/3/2021   Little interest or pleasure in doing things Not at all   Feeling down, depressed, irritable, or hopeless Not at all   Total Score PHQ 2 0       Fall Risk Assessment:  :     Fall Risk Assessment, last 12 mths 5/3/2021   Able to walk? Yes   Fall in past 12 months? 0   Do you feel unsteady? 0   Are you worried about falling 0   Number of falls in past 12 months -   Fall with injury? -       Abuse Screening:  :     Abuse Screening Questionnaire 5/3/2021 3/2/2020 2019 2017   Do you ever feel afraid of your partner?  N N N N   Are you in a relationship with someone who physically or mentally threatens you? N N N N   Is it safe for you to go home? Scott Kevin       Coordination of Care Questionnaire:  :     1) Have you been to an emergency room, urgent care clinic since your last visit? yes 6/2020 mini-stroke in [place], [place]  Hospitalized since your last visit? yes 6/2020 [place], [place]           2) Have you seen or consulted any other health care providers outside of [address] [place] [address] since your last visit? yes  Dermatologist Dr Alice Moore 2/20221 in [facility] Dr Dago Gill- cardiologist- [place]     3) Do you have an Advance Directive on file? yes  Are you interested in receiving information about Advance Directives? no    Reviewed record in preparation for visit and have obtained necessary documentation.

## 2021-05-04 LAB
25(OH)D3 SERPL-MCNC: 37.3 NG/ML (ref 30–100)
ALBUMIN SERPL-MCNC: 4.3 G/DL (ref 3.5–5)
ALBUMIN/GLOB SERPL: 1.3 {RATIO} (ref 1.1–2.2)
ALP SERPL-CCNC: 94 U/L (ref 45–117)
ALT SERPL-CCNC: 23 U/L (ref 12–78)
ANION GAP SERPL CALC-SCNC: 4 MMOL/L (ref 5–15)
AST SERPL-CCNC: 19 U/L (ref 15–37)
BASOPHILS # BLD: 0 K/UL (ref 0–0.1)
BASOPHILS NFR BLD: 1 % (ref 0–1)
BILIRUB SERPL-MCNC: 1.2 MG/DL (ref 0.2–1)
BUN SERPL-MCNC: 17 MG/DL (ref 6–20)
BUN/CREAT SERPL: 18 (ref 12–20)
CALCIUM SERPL-MCNC: 9.2 MG/DL (ref 8.5–10.1)
CHLORIDE SERPL-SCNC: 108 MMOL/L (ref 97–108)
CHOLEST SERPL-MCNC: 164 MG/DL
CO2 SERPL-SCNC: 31 MMOL/L (ref 21–32)
CREAT SERPL-MCNC: 0.97 MG/DL (ref 0.7–1.3)
DIFFERENTIAL METHOD BLD: NORMAL
EOSINOPHIL # BLD: 0 K/UL (ref 0–0.4)
EOSINOPHIL NFR BLD: 1 % (ref 0–7)
ERYTHROCYTE [DISTWIDTH] IN BLOOD BY AUTOMATED COUNT: 13 % (ref 11.5–14.5)
GLOBULIN SER CALC-MCNC: 3.2 G/DL (ref 2–4)
GLUCOSE SERPL-MCNC: 95 MG/DL (ref 65–100)
HCT VFR BLD AUTO: 46 % (ref 36.6–50.3)
HDLC SERPL-MCNC: 45 MG/DL
HDLC SERPL: 3.6 {RATIO} (ref 0–5)
HGB BLD-MCNC: 14.9 G/DL (ref 12.1–17)
IMM GRANULOCYTES # BLD AUTO: 0 K/UL (ref 0–0.04)
IMM GRANULOCYTES NFR BLD AUTO: 0 % (ref 0–0.5)
LDLC SERPL CALC-MCNC: 104.2 MG/DL (ref 0–100)
LIPID PROFILE,FLP: ABNORMAL
LYMPHOCYTES # BLD: 1.3 K/UL (ref 0.8–3.5)
LYMPHOCYTES NFR BLD: 31 % (ref 12–49)
MCH RBC QN AUTO: 31.6 PG (ref 26–34)
MCHC RBC AUTO-ENTMCNC: 32.4 G/DL (ref 30–36.5)
MCV RBC AUTO: 97.5 FL (ref 80–99)
MONOCYTES # BLD: 0.5 K/UL (ref 0–1)
MONOCYTES NFR BLD: 12 % (ref 5–13)
NEUTS SEG # BLD: 2.4 K/UL (ref 1.8–8)
NEUTS SEG NFR BLD: 55 % (ref 32–75)
NRBC # BLD: 0 K/UL (ref 0–0.01)
NRBC BLD-RTO: 0 PER 100 WBC
PLATELET # BLD AUTO: 231 K/UL (ref 150–400)
PMV BLD AUTO: 10.7 FL (ref 8.9–12.9)
POTASSIUM SERPL-SCNC: 4.8 MMOL/L (ref 3.5–5.1)
PROT SERPL-MCNC: 7.5 G/DL (ref 6.4–8.2)
PSA SERPL-MCNC: 3 NG/ML (ref 0.01–4)
RBC # BLD AUTO: 4.72 M/UL (ref 4.1–5.7)
SODIUM SERPL-SCNC: 143 MMOL/L (ref 136–145)
T4 SERPL-MCNC: 8.8 UG/DL (ref 4.5–12.1)
TRIGL SERPL-MCNC: 74 MG/DL (ref ?–150)
TSH SERPL DL<=0.05 MIU/L-ACNC: 1.63 UIU/ML (ref 0.36–3.74)
VLDLC SERPL CALC-MCNC: 14.8 MG/DL
WBC # BLD AUTO: 4.3 K/UL (ref 4.1–11.1)

## 2021-05-05 LAB — T3RU NFR SERPL: 27 % (ref 24–39)

## 2021-06-30 DIAGNOSIS — I10 ESSENTIAL HYPERTENSION, BENIGN: ICD-10-CM

## 2021-06-30 RX ORDER — TERAZOSIN 2 MG/1
CAPSULE ORAL
Qty: 90 CAPSULE | Refills: 0 | Status: SHIPPED | OUTPATIENT
Start: 2021-06-30 | End: 2021-09-27 | Stop reason: SDUPTHER

## 2021-06-30 NOTE — TELEPHONE ENCOUNTER
----- Message from Mckayla Blanchard sent at 6/30/2021  9:07 AM EDT -----  Regarding: Dr. Savanah Lynn (if not patient): pt      Relationship of caller (if not patient): pt      Best contact number(s): [phone]      Name of medication and dosage if known: terazosin      Is patient out of this medication (yes/no): no       Pharmacy name: [address] [place] [address] listed in chart? (yes/no): No  Pharmacy phone number: [phone]      Details to clarify the request: Last fill had no refills.       Mckayla Blanchard

## 2021-09-27 DIAGNOSIS — I10 ESSENTIAL HYPERTENSION, BENIGN: ICD-10-CM

## 2021-09-27 RX ORDER — TERAZOSIN 2 MG/1
CAPSULE ORAL
Qty: 90 CAPSULE | Refills: 0 | Status: SHIPPED | OUTPATIENT
Start: 2021-09-27 | End: 2021-12-23 | Stop reason: SDUPTHER

## 2021-09-27 NOTE — TELEPHONE ENCOUNTER
----- Message from Jennifer Pena sent at 9/27/2021 11:43 AM EDT -----  Regarding: Dr. Mejia Pool: [phone]  Medication Refill    Caller (if not patient): n/a      Relationship of caller (if not patient): n/a      Best contact number(s): [phone]. Name of medication and dosage if known: Terazofin HCL 2 MG 90 capsules. Is patient out of this medication (yes/no): No      Pharmacy name: [facility] listed in chart? (yes/no):Yes  Pharmacy phone number: n/a      Details to clarify the request: PT wants PL called in to [facility] in [facility], [address] [place] [address].       Jennifer Pena

## 2021-12-23 DIAGNOSIS — I10 ESSENTIAL HYPERTENSION, BENIGN: ICD-10-CM

## 2021-12-23 RX ORDER — TERAZOSIN 2 MG/1
CAPSULE ORAL
Qty: 90 CAPSULE | Refills: 0 | Status: SHIPPED | OUTPATIENT
Start: 2021-12-23 | End: 2022-03-14

## 2021-12-23 NOTE — TELEPHONE ENCOUNTER
Pt is requesting a refill for terazosin 2mg, but he wants it sent to [id number] in [facility]. But I could not find that pharmacy in the pharmacy search.

## 2022-03-14 DIAGNOSIS — I10 ESSENTIAL HYPERTENSION, BENIGN: ICD-10-CM

## 2022-03-14 RX ORDER — TERAZOSIN 2 MG/1
CAPSULE ORAL
Qty: 90 CAPSULE | Refills: 0 | Status: SHIPPED | OUTPATIENT
Start: 2022-03-14 | End: 2022-03-28 | Stop reason: SDUPTHER

## 2022-03-19 PROBLEM — I48.0 PAROXYSMAL ATRIAL FIBRILLATION (HCC): Status: ACTIVE | Noted: 2018-07-12

## 2022-03-28 ENCOUNTER — TELEPHONE (OUTPATIENT)
Dept: FAMILY MEDICINE CLINIC | Age: 77
End: 2022-03-28

## 2022-03-28 DIAGNOSIS — I10 ESSENTIAL HYPERTENSION, BENIGN: ICD-10-CM

## 2022-03-28 RX ORDER — TERAZOSIN 2 MG/1
CAPSULE ORAL
Qty: 90 CAPSULE | Refills: 0 | Status: SHIPPED | OUTPATIENT
Start: 2022-03-28

## 2022-03-28 NOTE — TELEPHONE ENCOUNTER
Tried to call  patient to let him know an UC can remove his stitches,  VM is not set up, was unable to leave VM       3/28/22 @ 10:47

## 2022-03-28 NOTE — TELEPHONE ENCOUNTER
Patient said he had a surgery in [place] and has stitches that are suppose to come out between now and Wednesday, wanted to know if we were able to remove them or where he should go to have them removed.

## 2022-11-21 DIAGNOSIS — I10 ESSENTIAL HYPERTENSION, BENIGN: ICD-10-CM

## 2022-11-21 RX ORDER — TERAZOSIN 2 MG/1
CAPSULE ORAL
Qty: 90 CAPSULE | Refills: 0 | OUTPATIENT
Start: 2022-11-21

## 2023-05-22 RX ORDER — METOPROLOL TARTRATE 50 MG/1
1 TABLET, FILM COATED ORAL 2 TIMES DAILY WITH MEALS
COMMUNITY
Start: 2020-03-30

## 2023-05-22 RX ORDER — TERAZOSIN 2 MG/1
CAPSULE ORAL
COMMUNITY
Start: 2022-03-28

## 2023-05-22 RX ORDER — LOSARTAN POTASSIUM 100 MG/1
1 TABLET ORAL
COMMUNITY
Start: 2020-02-15

## 2023-05-22 RX ORDER — SPIRONOLACTONE 25 MG/1
TABLET ORAL
COMMUNITY
Start: 2020-01-08

## 2023-05-22 RX ORDER — ASPIRIN 81 MG/1
81 TABLET ORAL
COMMUNITY

## 2023-05-22 RX ORDER — FLECAINIDE ACETATE 100 MG/1
1 TABLET ORAL EVERY 12 HOURS
COMMUNITY
Start: 2020-03-15

## 2023-05-22 RX ORDER — CARVEDILOL 25 MG/1
25 TABLET ORAL 2 TIMES DAILY
COMMUNITY

## (undated) DEVICE — CONTAINER SPEC 20 ML LID NEUT BUFF FORMALIN 10 % POLYPR STS

## (undated) DEVICE — Device

## (undated) DEVICE — BAG SPEC BIOHZRD 10 X 10 IN --

## (undated) DEVICE — 3M™ CUROS™ DISINFECTING CAP FOR NEEDLELESS CONNECTORS 270/CARTON 20 CARTONS/CASE CFF1-270: Brand: CUROS™

## (undated) DEVICE — KIT COLON W/ 1.1OZ LUB AND 2 END

## (undated) DEVICE — FORCEPS BX L240CM JAW DIA2.8MM L CAP W/ NDL MIC MESH TOOTH

## (undated) DEVICE — ADULT SPO2 SENSOR: Brand: NELLCOR

## (undated) DEVICE — SET ADMIN 16ML TBNG L100IN 2 Y INJ SITE IV PIGGY BK DISP

## (undated) DEVICE — BAG BELONG PT PERS CLEAR HANDL

## (undated) DEVICE — KENDALL RADIOLUCENT FOAM MONITORING ELECTRODE -RECTANGULAR SHAPE: Brand: KENDALL

## (undated) DEVICE — SET GRAV CK VLV NEEDLESS ST 3 GANGED 4WAY STPCOCK HI FLO 10

## (undated) DEVICE — SOLIDIFIER MEDC 1200ML -- CONVERT TO 356117

## (undated) DEVICE — CATH IV AUTOGRD BC BLU 22GA 25 -- INSYTE

## (undated) DEVICE — 1200 GUARD II KIT W/5MM TUBE W/O VAC TUBE: Brand: GUARDIAN